# Patient Record
Sex: FEMALE | Race: WHITE | NOT HISPANIC OR LATINO | Employment: FULL TIME | URBAN - METROPOLITAN AREA
[De-identification: names, ages, dates, MRNs, and addresses within clinical notes are randomized per-mention and may not be internally consistent; named-entity substitution may affect disease eponyms.]

---

## 2017-01-27 ENCOUNTER — OFFICE VISIT (OUTPATIENT)
Dept: FAMILY MEDICINE | Facility: CLINIC | Age: 27
End: 2017-01-27
Payer: COMMERCIAL

## 2017-01-27 VITALS
SYSTOLIC BLOOD PRESSURE: 130 MMHG | WEIGHT: 127.25 LBS | BODY MASS INDEX: 21.72 KG/M2 | TEMPERATURE: 97.7 F | HEART RATE: 98 BPM | HEIGHT: 64 IN | OXYGEN SATURATION: 100 % | DIASTOLIC BLOOD PRESSURE: 79 MMHG

## 2017-01-27 DIAGNOSIS — J20.9 ACUTE BRONCHITIS, UNSPECIFIED ORGANISM: ICD-10-CM

## 2017-01-27 DIAGNOSIS — J01.90 ACUTE SINUSITIS WITH SYMPTOMS > 10 DAYS: ICD-10-CM

## 2017-01-27 DIAGNOSIS — R09.82 POST-NASAL DRIP: ICD-10-CM

## 2017-01-27 DIAGNOSIS — J06.9 UPPER RESPIRATORY TRACT INFECTION, UNSPECIFIED TYPE: Primary | ICD-10-CM

## 2017-01-27 PROCEDURE — 99214 OFFICE O/P EST MOD 30 MIN: CPT | Performed by: FAMILY MEDICINE

## 2017-01-27 RX ORDER — AMOXICILLIN AND CLAVULANATE POTASSIUM 500; 125 MG/1; MG/1
1 TABLET, FILM COATED ORAL 3 TIMES DAILY
Qty: 30 TABLET | Refills: 0 | Status: CANCELLED | OUTPATIENT
Start: 2017-01-27

## 2017-01-27 RX ORDER — AZITHROMYCIN 250 MG/1
TABLET, FILM COATED ORAL
Qty: 6 TABLET | Refills: 0 | Status: SHIPPED | OUTPATIENT
Start: 2017-01-27 | End: 2018-01-14

## 2017-01-27 RX ORDER — BENZONATATE 100 MG/1
100 CAPSULE ORAL 3 TIMES DAILY PRN
Qty: 42 CAPSULE | Refills: 0 | Status: SHIPPED | OUTPATIENT
Start: 2017-01-27 | End: 2018-01-14

## 2017-01-27 NOTE — PROGRESS NOTES
SUBJECTIVE:                                                    Marisol Banks is a 26 year old female who presents to clinic today for the following health issues:    RESPIRATORY SYMPTOMS      Duration: x a little over 1 week ; last couple of days patient thought she was on the upswing but yesterday progressively started to get worse    yesterday soreness in chest when cough and breathes, heavy , fever wed 2 days ago    Has had chills but also cold outside     Last night throat tight to swallow. 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     Therapies tried and outcome:  Cheryl [place] Cold and Flu ; tea & honey ; occ ibu for headache   Has a 20 mth old also sick at home  Has to work next 5 night shifts.    Problem list and histories reviewed & adjusted, as indicated.  Additional history: as documented    Patient Active Problem List   Diagnosis     Contraception     Past Surgical History   Procedure Laterality Date     Laparoscopic oophorectomy  2008     dermoid cyst, --actually had to do a low transverse incision     [place] teeth extraction         Social History   Substance Use Topics     Smoking status: Never Smoker      Smokeless tobacco: Never Used     Alcohol Use: No     Family History   Problem Relation Age of Onset     Myocardial Infarction Maternal Grandmother 50     in her 50s     HEART DISEASE Maternal Grandmother      Hypertension Maternal  "Grandfather      CEREBROVASCULAR DISEASE Maternal Grandfather      Breast Cancer Paternal Grandmother      CANCER Paternal Grandmother      bone marrow     Thyroid Disease Mother      HEART DISEASE Father      CANCER Maternal Grandfather      oral cancer, smoker         Current Outpatient Prescriptions   Medication Sig Dispense Refill     Multiple Vitamin (MULTIVITAMINS PO)        Allergies   Allergen Reactions     Pyrithione      Recent Labs   Lab Test  05/29/15   1957  01/03/14   0908   LDL   --   140*   HDL   --   61   TRIG   --   105   ALT  22   --    CR  1.06*   --    GFRESTIMATED  62   --    GFRESTBLACK  75   --    POTASSIUM  4.5   --       BP Readings from Last 3 Encounters:   01/27/17 130/79   02/27/16 134/84   05/31/15 136/84    Wt Readings from Last 3 Encounters:   01/27/17 127 lb 4 oz (57.72 kg)   02/27/16 118 lb (53.524 kg)   05/23/15 151 lb (68.493 kg)                  Labs reviewed in EPIC  Problem list, Medication list, Allergies, and Medical/Social/Surgical histories reviewed in [facility] and updated as appropriate.    ROS:  Constitutional, HEENT, cardiovascular, pulmonary, GI, , musculoskeletal, neuro, skin, endocrine and psych systems are negative, except as otherwise noted.    OBJECTIVE:                                                    /79 mmHg  Pulse 98  Temp(Src) 97.7  F (36.5  C) (Oral)  Ht 5' 4\" (1.626 m)  Wt 127 lb 4 oz (57.72 kg)  BMI 21.83 kg/m2  SpO2 100%  Body mass index is 21.83 kg/(m^2).  GENERAL: healthy, alert and no distress, has a cough , runny nose   EYES: Eyes grossly normal to inspection, PERRL and conjunctivae and sclerae normal  HENT: ear canals and TM's normal, nose and mouth without ulcers or lesions, clear post nasal drip, sinus mildly tender   NECK: no adenopathy, no asymmetry, masses, or scars and thyroid normal to palpation  RESP: lungs clear to auscultation - no rales, rhonchi or wheezes, has a cough   CV: regular rate and rhythm, normal S1 S2, no S3 or S4, no " murmur, click or rub, no peripheral edema and peripheral pulses strong  ABDOMEN: soft, non tender, no hepatosplenomegaly, no masses and bowel sounds normal  MS: no gross musculoskeletal defects noted, no edema  SKIN: no suspicious lesions or rashes  NEURO: Normal strength and tone, mentation intact and speech normal  PSYCH: mentation appears normal, affect normal/bright    Diagnostic Test Results:  No results found for this or any previous visit (from the past 24 hour(s)).     ASSESSMENT/PLAN:                                                      1. 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 Here today for a cold.  Your symptoms and exam today indicate that you have a viral upper respiratory illness.  This includes viral rhinosinusitis and viral bronchitis.  Antibiotics do not help viral illnesses; the best remedies treat the symptoms (see below).  The typical course of a viral illness is that you feel rather miserable for the first few days - with sore throat, runny nose/nasal congestion, cough, and sometimes fever and body aches.  You should start to feel better after about 5-7 days and much better by 10-14 days.  If you develop sudden worsening of symptoms or fever after the first 5-7 days, or if you have persistence of your symptoms beyond 14 days, let us know as you may have developed a secondary bacterial infection.      For symptom relief I suggest tryin. Steam.  Take a long, hot shower.  Or if you don't want to get in the shower just run it with the bathroom door shut for a few minutes and breathe the steam.  2. Drink hot liquids frequently such as tea or hot water with honey and lemon.  3. Acetaminophen (Tylenol) and ibuprofen  (Motrin or Advil) as needed for headache, sore throat, body aches, or fever.  4. For loosening phlegm and sputum try guaifenesin (available in many combination products and alone as plain Robitussin or plain Mucinex) and for cough suppression you can try dextromethorphan (Delsym or combined in other products).  5. For nasal congestion try:    An oral decongestant.  The only decongestant I recommend is pseudoephedrine. Ask the pharmacist for the over the counter (but real) pseudoephedrine - not phenylephrine.  This can raise your blood pressure and heart rate so do not use this if you have hypertension.       Afrin spray for 3 days.  (Never use afrin nasal spray for more than 3 days as there is a risk of developing tolerance and rebound/worsening nasal congestion if used longer than this.)    Nealmed sinus rinses.    Nasal steroid spray such as nasacort or flonase, which are over-the-counter.  6. And most importantly: plenty of rest and sleep  especially while you have a fever.     Stop smoking and avoid secondhand smoke    Drink lots of fluids such as water and clear soups. Fluids help loosen mucus. Fluids are also important because they help prevent dehydration.    Gargle with warm salt water a few times a day to relieve a sore throat. Throat sprays or lozenges may also help relieve the pain.    Avoid alcohol.    Use saline (salt water) nose drops to help loosen mucus and moisten the tender skin in your nose.  Encouraged mucinex, warm salt water gargles, cepacol spray, soothers/lozenges, sinus rinses (neilmed), flonase (2 sprays per nostril daily x 2 weeks), vitamin C, fluids and rest.  May alternate tylenol and NSAIDS (ibuprofen, Advil, aleve type products) every 4-6 hours for the next few days as needed.   No need for oral antibiotic at this time.    2. Acute sinusitis with symptoms > 10 days  if not better chest xray and cbc  Tessalon Perles three times a day for cough as needed  zpak  abx if not better on  above  Go to the Er if worse  Letter for work , Excused two days  Follow up Primary dr House for routine care  Recommend flu shot yearly and tdap if not up to date    3. Post-nasal drip  flonase & zyrtec    4. Acute bronchitis, unspecified organism  - benzonatate (TESSALON) 100 MG capsule; Take 1 capsule (100 mg) by mouth 3 times daily as needed for cough  Dispense: 42 capsule; Refill: 0  - azithromycin (ZITHROMAX) 250 MG tablet; Two tablets first day, then one tablet daily for four days.  Dispense: 6 tablet; Refill: 0 if not better     See Patient Instructions  Patient Instructions     flonase 1 spray each nostril daily 2 weeks  Zyrtec 10 mg daily 2 weeks  mucinex 600 mg twice a day 2 weeks  Supportive care as below  If not better chest xray and cbc  Tessalon perles three times a day for cough as needed  zpak  abx if not better on above  Go to the Er if worse  Letter for work   Excused two days  Follow up Primary dr House for routine care  Recommend flu shot yearly and tdap if not up to date    Your symptoms and exam today indicate that you have a viral upper respiratory illness.  This includes viral rhinosinusitis and viral bronchitis.  Antibiotics do not help viral illnesses; the best remedies treat the symptoms (see below).  The typical course of a viral illness is that you feel rather miserable for the first few days - with sore throat, runny nose/nasal congestion, cough, and sometimes fever and body aches.  You should start to feel better after about 5-7 days and much better by 10-14 days.  If you develop sudden worsening of symptoms or fever after the first 5-7 days, or if you have persistence of your symptoms beyond 14 days, let us know as you may have developed a secondary bacterial infection.      For symptom relief I suggest tryin. Steam.  Take a long, hot shower.  Or if you don't want to get in the shower just run it with the bathroom door shut for a few minutes and breathe the steam.  2. Drink hot  liquids frequently such as tea or hot water with honey and lemon.  3. Acetaminophen (Tylenol) and ibuprofen (Motrin or Advil) as needed for headache, sore throat, body aches, or fever.  4. For loosening phlegm and sputum try guaifenesin (available in many combination products and alone as plain Robitussin or plain Mucinex) and for cough suppression you can try dextromethorphan (Delsym or combined in other products).  5. For nasal congestion try:    An oral decongestant.  The only decongestant I recommend is pseudoephedrine. Ask the pharmacist for the over the counter (but real) pseudoephedrine - not phenylephrine.  This can raise your blood pressure and heart rate so do not use this if you have hypertension.       Afrin spray for 3 days.  (Never use afrin nasal spray for more than 3 days as there is a risk of developing tolerance and rebound/worsening nasal congestion if used longer than this.)    Nealmed sinus rinses.    Nasal steroid spray such as nasacort or flonase, which are over-the-counter.  6. And most importantly: plenty of rest and sleep  especially while you have a fever.     Stop smoking and avoid secondhand smoke    Drink lots of fluids such as water and clear soups. Fluids help loosen mucus. Fluids are also important because they help prevent dehydration.    Gargle with warm salt water a few times a day to relieve a sore throat. Throat sprays or lozenges may also help relieve the pain.    Avoid alcohol.    Use saline (salt water) nose drops to help loosen mucus and moisten the tender skin in your nose.  Encouraged mucinex, warm salt water gargles, cepacol spray, soothers/lozenges, sinus rinses (neilmed), flonase (2 sprays per nostril daily x 2 weeks), vitamin c, fluids and rest.  May alternate tylenol and NSAIDS (ibuprofen, advil, aleve type products) every 4-6 hours for the next few days as needed.   No need for oral antibiotic at this time.            Debra Odom MD  [facility]

## 2017-01-27 NOTE — MR AVS SNAPSHOT
After Visit Summary   2017    Marisol Banks    MRN: [id number]           Patient Information     Date Of Birth          1990        Visit Information        Provider Department      2017 9:20 AM Debra Odom MD [facility] [place]        Today's Diagnoses     Upper respiratory tract infection, unspecified type    -  1     Acute sinusitis with symptoms > 10 days         Post-nasal drip         Acute bronchitis, unspecified organism           Care Instructions      flonase 1 spray each nostril daily 2 weeks  Zyrtec 10 mg daily 2 weeks  mucinex 600 mg twice a day 2 weeks  Supportive care as below  If not better chest xray and cbc  Tessalon perles three times a day for cough as needed  zpak  abx if not better on above  Go to the Er if worse  Letter for work   Excused two days  Follow up Primary dr [facility] for routine care  Recommend flu shot yearly and tdap if not up to date    Your symptoms and exam today indicate that you have a viral upper respiratory illness.  This includes viral rhinosinusitis and viral bronchitis.  Antibiotics do not help viral illnesses; the best remedies treat the symptoms (see below).  The typical course of a viral illness is that you feel rather miserable for the first few days - with sore throat, runny nose/nasal congestion, cough, and sometimes fever and body aches.  You should start to feel better after about 5-7 days and much better by 10-14 days.  If you develop sudden worsening of symptoms or fever after the first 5-7 days, or if you have persistence of your symptoms beyond 14 days, let us know as you may have developed a secondary bacterial infection.      For symptom relief I suggest tryin. Steam.  Take a long, hot shower.  Or if you don't want to get in the shower just run it with the bathroom door shut for a few minutes and breathe the steam.  2. Drink hot liquids frequently such as tea or hot water with honey and lemon.  3. Acetaminophen  (Tylenol) and ibuprofen (Motrin or Advil) as needed for headache, sore throat, body aches, or fever.  4. For loosening phlegm and sputum try guaifenesin (available in many combination products and alone as plain Robitussin or plain Mucinex) and for cough suppression you can try dextromethorphan (Delsym or combined in other products).  5. For nasal congestion try:    An oral decongestant.  The only decongestant I recommend is pseudoephedrine. Ask the pharmacist for the over the counter (but real) pseudoephedrine - not phenylephrine.  This can raise your blood pressure and heart rate so do not use this if you have hypertension.       Afrin spray for 3 days.  (Never use afrin nasal spray for more than 3 days as there is a risk of developing tolerance and rebound/worsening nasal congestion if used longer than this.)    Nealmed sinus rinses.    Nasal steroid spray such as nasacort or flonase, which are over-the-counter.  6. And most importantly: plenty of rest and sleep  especially while you have a fever.     Stop smoking and avoid secondhand smoke    Drink lots of fluids such as water and clear soups. Fluids help loosen mucus. Fluids are also important because they help prevent dehydration.    Gargle with warm salt water a few times a day to relieve a sore throat. Throat sprays or lozenges may also help relieve the pain.    Avoid alcohol.    Use saline (salt water) nose drops to help loosen mucus and moisten the tender skin in your nose.  Encouraged mucinex, warm salt water gargles, cepacol spray, soothers/lozenges, sinus rinses (neilmed), flonase (2 sprays per nostril daily x 2 weeks), vitamin c, fluids and rest.  May alternate tylenol and NSAIDS (ibuprofen, advil, aleve type products) every 4-6 hours for the next few days as needed.   No need for oral antibiotic at this time.            Follow-ups after your visit        Who to contact     If you have questions or need follow up information about today's clinic visit  "or your schedule please contact [facility] directly at [phone].  Normal or non-critical lab and imaging results will be communicated to you by MyChart, letter or phone within 4 business days after the clinic has received the results. If you do not hear from us within 7 days, please contact the clinic through [employer] or phone. If you have a critical or abnormal lab result, we will notify you by phone as soon as possible.  Submit refill requests through [email or web address] or call your pharmacy and they will forward the refill request to us. Please allow 3 business days for your refill to be completed.          Additional Information About Your Visit        [email or web address] Information     [email or web address] gives you secure access to your electronic health record. If you see a primary care provider, you can also send messages to your care team and make appointments. If you have questions, please call your primary care clinic.  If you do not have a primary care provider, please call [phone] and they will assist you.        Care EveryWhere ID     This is your Care EveryWhere ID. This could be used by other organizations to access your [place] medical records  [id number]        Your Vitals Were     Pulse Temperature Height BMI (Body Mass Index) Pulse Oximetry       98 97.7  F (36.5  C) (Oral) 5' 4\" (1.626 m) 21.83 kg/m2 100%        Blood Pressure from Last 3 Encounters:   01/27/17 130/79   02/27/16 134/84   05/31/15 136/84    Weight from Last 3 Encounters:   01/27/17 127 lb 4 oz (57.72 kg)   02/27/16 118 lb (53.524 kg)   05/23/15 151 lb (68.493 kg)              Today, you had the following     No orders found for display         Today's Medication Changes          These changes are accurate as of: 1/27/17  9:43 AM.  If you have any questions, ask your nurse or doctor.               Start taking these medicines.        Dose/Directions    azithromycin 250 MG tablet   Commonly known as:  ZITHROMAX   Used for:  Acute " bronchitis, unspecified organism   Started by:  Debra Odom MD        Two tablets first day, then one tablet daily for four days.   Quantity:  6 tablet   Refills:  0       benzonatate 100 MG capsule   Commonly known as:  TESSALON   Used for:  Acute bronchitis, unspecified organism   Started by:  Debra Odom MD        Dose:  100 mg   Take 1 capsule (100 mg) by mouth 3 times daily as needed for cough   Quantity:  42 capsule   Refills:  0         Stop taking these medicines if you haven't already. Please contact your care team if you have questions.     COLACE PO   Stopped by:  Debra Odom MD           desogestrel-ethinyl estradiol 0.15-0.02/0.01 MG (21/5) per tablet   Commonly known as:  AZURETTE   Stopped by:  Debra Odom MD           ofloxacin 0.3 % ophthalmic solution   Commonly known as:  OCUFLOX   Stopped by:  Debra Odom MD           prenatal multivitamin  plus iron 27-0.8 MG Tabs per tablet   Stopped by:  Debra Odom MD           VITAMIN D (CHOLECALCIFEROL) PO   Stopped by:  Debra Odom MD                Where to get your medicines      These medications were sent to [facility] - [place], [place] - [address]  [address] [facility] [address]     Phone:  [phone]    - benzonatate 100 MG capsule      Some of these will need a paper prescription and others can be bought over the counter.  Ask your nurse if you have questions.     Bring a paper prescription for each of these medications    - azithromycin 250 MG tablet             Primary Care Provider Office Phone # Fax #    Katelyn Santacruz [phone]       [facility] [address]  [facility] [address]        Thank you!     Thank you for choosing [facility]  for your care. Our goal is always to provide you with excellent care. Hearing back from our patients is one way we can continue to improve our services. Please take a few minutes to complete the written survey that you may  receive in the mail after your visit with us. Thank you!             Your Updated Medication List - Protect others around you: Learn how to safely use, store and throw away your medicines at www.disposemymeds.org.          This list is accurate as of: 1/27/17  9:43 AM.  Always use your most recent med list.                   Brand Name Dispense Instructions for use    azithromycin 250 MG tablet    ZITHROMAX    6 tablet    Two tablets first day, then one tablet daily for four days.       benzonatate 100 MG capsule    TESSALON    42 capsule    Take 1 capsule (100 mg) by mouth 3 times daily as needed for cough       MULTIVITAMINS PO

## 2017-01-27 NOTE — PATIENT INSTRUCTIONS
flonase 1 spray each nostril daily 2 weeks  Zyrtec 10 mg daily 2 weeks  mucinex 600 mg twice a day 2 weeks  Supportive care as below  If not better chest xray and cbc  Tessalon perles three times a day for cough as needed  zpak  abx if not better on above  Go to the Er if worse  Letter for work   Excused two days  Follow up Primary dr [facility] for routine care  Recommend flu shot yearly and tdap if not up to date    Your symptoms and exam today indicate that you have a viral upper respiratory illness.  This includes viral rhinosinusitis and viral bronchitis.  Antibiotics do not help viral illnesses; the best remedies treat the symptoms (see below).  The typical course of a viral illness is that you feel rather miserable for the first few days - with sore throat, runny nose/nasal congestion, cough, and sometimes fever and body aches.  You should start to feel better after about 5-7 days and much better by 10-14 days.  If you develop sudden worsening of symptoms or fever after the first 5-7 days, or if you have persistence of your symptoms beyond 14 days, let us know as you may have developed a secondary bacterial infection.      For symptom relief I suggest tryin. Steam.  Take a long, hot shower.  Or if you don't want to get in the shower just run it with the bathroom door shut for a few minutes and breathe the steam.  2. Drink hot liquids frequently such as tea or hot water with honey and lemon.  3. Acetaminophen (Tylenol) and ibuprofen (Motrin or Advil) as needed for headache, sore throat, body aches, or fever.  4. For loosening phlegm and sputum try guaifenesin (available in many combination products and alone as plain Robitussin or plain Mucinex) and for cough suppression you can try dextromethorphan (Delsym or combined in other products).  5. For nasal congestion try:    An oral decongestant.  The only decongestant I recommend is pseudoephedrine. Ask the pharmacist for the over the counter (but real)  pseudoephedrine - not phenylephrine.  This can raise your blood pressure and heart rate so do not use this if you have hypertension.       Afrin spray for 3 days.  (Never use afrin nasal spray for more than 3 days as there is a risk of developing tolerance and rebound/worsening nasal congestion if used longer than this.)    Nealmed sinus rinses.    Nasal steroid spray such as nasacort or flonase, which are over-the-counter.  6. And most importantly: plenty of rest and sleep  especially while you have a fever.     Stop smoking and avoid secondhand smoke    Drink lots of fluids such as water and clear soups. Fluids help loosen mucus. Fluids are also important because they help prevent dehydration.    Gargle with warm salt water a few times a day to relieve a sore throat. Throat sprays or lozenges may also help relieve the pain.    Avoid alcohol.    Use saline (salt water) nose drops to help loosen mucus and moisten the tender skin in your nose.  Encouraged mucinex, warm salt water gargles, cepacol spray, soothers/lozenges, sinus rinses (neilmed), flonase (2 sprays per nostril daily x 2 weeks), vitamin c, fluids and rest.  May alternate tylenol and NSAIDS (ibuprofen, advil, aleve type products) every 4-6 hours for the next few days as needed.   No need for oral antibiotic at this time.

## 2017-01-27 NOTE — Clinical Note
[facility]  [address] [facility] [address]  Phone: [phone]    January 27, 2017        Marisol Banks  [address] [facility] [address]          To whom it may concern:    RE: Marisol Banks    Patient was seen and treated today at our clinic and missed work. May return to work 1/29/17 if well     Please contact me for questions or concerns.      Sincerely,        Debra Odom MD

## 2017-11-26 ENCOUNTER — HEALTH MAINTENANCE LETTER (OUTPATIENT)
Age: 27
End: 2017-11-26

## 2018-01-14 ENCOUNTER — OFFICE VISIT (OUTPATIENT)
Dept: URGENT CARE | Facility: URGENT CARE | Age: 28
End: 2018-01-14
Payer: COMMERCIAL

## 2018-01-14 VITALS
HEIGHT: 64 IN | TEMPERATURE: 98.3 F | OXYGEN SATURATION: 100 % | HEART RATE: 88 BPM | DIASTOLIC BLOOD PRESSURE: 82 MMHG | SYSTOLIC BLOOD PRESSURE: 144 MMHG | WEIGHT: 128 LBS | BODY MASS INDEX: 21.85 KG/M2

## 2018-01-14 DIAGNOSIS — J02.9 VIRAL PHARYNGITIS: Primary | ICD-10-CM

## 2018-01-14 LAB
DEPRECATED S PYO AG THROAT QL EIA: NORMAL
SPECIMEN SOURCE: NORMAL

## 2018-01-14 PROCEDURE — 99213 OFFICE O/P EST LOW 20 MIN: CPT | Performed by: FAMILY MEDICINE

## 2018-01-14 PROCEDURE — 87880 STREP A ASSAY W/OPTIC: CPT | Performed by: FAMILY MEDICINE

## 2018-01-14 PROCEDURE — 87081 CULTURE SCREEN ONLY: CPT | Performed by: FAMILY MEDICINE

## 2018-01-14 ASSESSMENT — ENCOUNTER SYMPTOMS
FREQUENCY: 0
NAUSEA: 0
COUGH: 0
DIARRHEA: 0
ABDOMINAL PAIN: 0
DYSURIA: 0
BLURRED VISION: 0
FEVER: 0
VOMITING: 0
CHILLS: 0
CONSTIPATION: 0
SHORTNESS OF BREATH: 0

## 2018-01-14 NOTE — PATIENT INSTRUCTIONS

## 2018-01-14 NOTE — PROGRESS NOTES
"SUBJECTIVE:  Marisol Banks is a 27 year old female with a chief complaint of sore throat.  Onset of symptoms was 6 day(s) ago.    Course of illness: worsening.  Severity moderate, mainly concerned about exudates on back of throat.   Current and Associated symptoms: cough - non-productive, body aches and fatigue doesn't feel she has the flu  Treatment measures tried include Tylenol/Ibuprofen and Rest.  Predisposing factors include ill contact: Work.    Past Medical History:   Diagnosis Date     Chest pain     evaluated years ago, thought anxiety, normal Holter, normal EKG     Hypertension     \"white coat\" work ups normal     Indication for care in labor or delivery 2015      (normal spontaneous vaginal delivery) 2015     Postpartum care and examination immediately after delivery 2015     No current outpatient prescriptions on file.     Social History   Substance Use Topics     Smoking status: Never Smoker     Smokeless tobacco: Never Used     Alcohol use No       ROS:  Review of Systems   Constitutional: Negative for chills and fever.   Eyes: Negative for blurred vision.   Respiratory: Negative for cough and shortness of breath.    Cardiovascular: Negative for chest pain.   Gastrointestinal: Negative for abdominal pain, constipation, diarrhea, nausea and vomiting.   Genitourinary: Negative for dysuria and frequency.   Skin: Negative for rash.         OBJECTIVE:   /82 (BP Location: Right arm, Patient Position: Chair, Cuff Size: Adult Regular)  Pulse 88  Temp 98.3  F (36.8  C) (Oral)  Ht 5' 4\" (1.626 m)  Wt 128 lb (58.1 kg)  SpO2 100%  BMI 21.97 kg/m2  GENERAL APPEARANCE: healthy, alert and no distress  EYES: EOMI,  PERRL, conjunctiva clear  HENT: ear canals and TM's normal.  Nose normal.  Pharynx erythematous with some exudate noted.  NECK: supple, non-tender to palpation, no adenopathy noted  RESP: lungs clear to auscultation - no rales, rhonchi or wheezes  CV: regular rates and " rhythm, normal S1 S2, no murmur noted  ABDOMEN:  soft, nontender, no HSM or masses and bowel sounds normal  SKIN: no suspicious lesions or rashes    Rapid Strep test is negative; await throat culture results.    ASSESSMENT:   Pharyngitis  Marisol was seen today for pharyngitis and urgent care.    Diagnoses and all orders for this visit:    Pharyngitis  -     Strep, Rapid Screen  -     Beta strep group A culture      PLAN:   See orders in epic.   Symptomatic treat with gargles, lozenges, and OTC analgesic as needed. Follow-up with primary clinic if not improving.    Aki Kaur

## 2018-01-14 NOTE — NURSING NOTE
"Marisol Banks;   Chief Complaint   Patient presents with     Pharyngitis     onset 1 week ago, white spots on right tonsill, cold sx.      Urgent Care     Initial /82 (BP Location: Right arm, Patient Position: Chair, Cuff Size: Adult Regular)  Pulse 88  Temp 98.3  F (36.8  C) (Oral)  Ht 5' 4\" (1.626 m)  Wt 128 lb (58.1 kg)  SpO2 100%  BMI 21.97 kg/m2 Estimated body mass index is 21.97 kg/(m^2) as calculated from the following:    Height as of this encounter: 5' 4\" (1.626 m).    Weight as of this encounter: 128 lb (58.1 kg)..  BP completed using cuff size regular.  Janie Mendez R.N.  "

## 2018-01-14 NOTE — MR AVS SNAPSHOT
After Visit Summary   1/14/2018    Marisol Banks    MRN: [id number]           Patient Information     Date Of Birth          1990        Visit Information        Provider Department      1/14/2018 2:15 PM Aki Kaur MD [facility]        Today's Diagnoses     Pharyngitis    -  1      Care Instructions       * PHARYNGITIS (Sore Throat),REPORT PENDING    Pharyngitis (sore throat) is often due to a virus, but can also be caused by the  strep  bacteria. This is called  strep throat . Both viral and strep infection can cause throat pain that is worse when swallowing, aching all over with headache and fever. Both types of infections are contagious. They may be spread by coughing, kissing or touching others after touching your mouth or nose, so wash your hands often.  A test has been done to determine whether or not you have strep throat. If it is positive for strep infection you will usually need to take antibiotics. If the test is negative, you probably have a viral pharyngitis, and antibiotic treatment will not help you recover.  HOME CARE:    If your symptoms are severe, rest at home for the first 2-3 days. If you are told that your test is positive for strep, you should be off work and school for the first two days of antibiotic treatment. After that, you will no longer be as contagious.    Children: Use acetaminophen (Tylenol) for fever, fussiness or discomfort. In infants over six months of age, you may use ibuprofen (Children's Motrin) instead of Tylenol. [NOTE: If your child has chronic liver or kidney disease or ever had a stomach ulcer or GI bleeding, talk with your doctor before using these medicines.]   (Aspirin should never be used in anyone under 18 years of age who is ill with a fever. It may cause severe liver damage.)  Adults: You may use acetaminophen (Tylenol) 650-1000 mg every 6 hours or ibuprofen (Motrin, Advil) 600 mg every 6-8 hours with food to  control pain, if you are able to take these medicines. [NOTE: If you have chronic liver or kidney disease or ever had a stomach ulcer or GI bleeding, talk with your doctor before using these medicines.]    Throat lozenges or sprays (Chloraseptic and others), or gargling with warm salt water will reduce throat pain. Dissolve 1/2 teaspoon of salt in 1 glass of warm water. This is especially useful just before meals.     FOLLOW UP with your doctor as advised by our staff if you are not improving over the next week.  GET PROMPT MEDICAL ATTENTION  if any of the following occur:    Fever over 101 F (38.3 C) for more than three days    New or worsening ear pain, sinus pain or headache    Unable to swallow liquids or open your mouth wide due to throat pain    Trouble breathing    Muffled voice    New rash       4001-1895 [employer]. [address] [place] [address]. All rights reserved. This information is not intended as a substitute for professional medical care. Always follow your healthcare professional's instructions.  This information has been modified by your health care provider with permission from the publisher.            Follow-ups after your visit        Follow-up notes from your care team     Return if symptoms worsen or fail to improve.      Who to contact     If you have questions or need follow up information about today's clinic visit or your schedule please contact [facility] directly at [phone].  Normal or non-critical lab and imaging results will be communicated to you by MyChart, letter or phone within 4 business days after the clinic has received the results. If you do not hear from us within 7 days, please contact the clinic through MyChart or phone. If you have a critical or abnormal lab result, we will notify you by phone as soon as possible.  Submit refill requests through [employer] or call your pharmacy and they will forward the refill request to  "us. Please allow 3 business days for your refill to be completed.          Additional Information About Your Visit        MyChart Information     [employer] gives you secure access to your electronic health record. If you see a primary care provider, you can also send messages to your care team and make appointments. If you have questions, please call your primary care clinic.  If you do not have a primary care provider, please call [phone] and they will assist you.        Care EveryWhere ID     This is your Care EveryWhere ID. This could be used by other organizations to access your [facility] medical records  [id number]        Your Vitals Were     Pulse Temperature Height Pulse Oximetry BMI (Body Mass Index)       88 98.3  F (36.8  C) (Oral) 5' 4\" (1.626 m) 100% 21.97 kg/m2        Blood Pressure from Last 3 Encounters:   01/14/18 144/82   01/27/17 130/79   02/27/16 134/84    Weight from Last 3 Encounters:   01/14/18 128 lb (58.1 kg)   01/27/17 127 lb 4 oz (57.7 kg)   02/27/16 118 lb (53.5 kg)              We Performed the Following     Beta strep group A culture     Strep, Rapid Screen          Today's Medication Changes          These changes are accurate as of: 1/14/18  3:37 PM.  If you have any questions, ask your nurse or doctor.               Stop taking these medicines if you haven't already. Please contact your care team if you have questions.     azithromycin 250 MG tablet   Commonly known as:  ZITHROMAX   Stopped by:  Aki Kaur MD           benzonatate 100 MG capsule   Commonly known as:  TESSALON   Stopped by:  Aki Kaur MD           MULTIVITAMINS PO   Stopped by:  Aki Kaur MD                    Primary Care Provider Office Phone # Fax #    Katelyn Santacruz [phone] [address] [place] [address]        Equal Access to Services     CHILANGO JOSHI AH: Christian Jacobo, shelley rodriguez, qaybpamela paganalmarivel hylton " dashawn schwartzjoseilsa mirandaAngelikaaakevin ah. So [facility] [phone].    ATENCIÓN: Si habla español, tiene a aparicio disposición servicios gratuitos de asistencia lingüística. Saleem al [phone].    We comply with applicable federal civil rights laws and [place] laws. We do not discriminate on the basis of race, color, national origin, age, disability, sex, sexual orientation, or gender identity.            Thank you!     Thank you for choosing [facility]  for your care. Our goal is always to provide you with excellent care. Hearing back from our patients is one way we can continue to improve our services. Please take a few minutes to complete the written survey that you may receive in the mail after your visit with us. Thank you!             Your Updated Medication List - Protect others around you: Learn how to safely use, store and throw away your medicines at www.disposemymeds.org.      Notice  As of 1/14/2018  3:37 PM    You have not been prescribed any medications.

## 2018-01-15 LAB
BACTERIA SPEC CULT: NORMAL
SPECIMEN SOURCE: NORMAL

## 2018-04-16 ENCOUNTER — VIRTUAL VISIT (OUTPATIENT)
Dept: FAMILY MEDICINE | Facility: OTHER | Age: 28
End: 2018-04-16

## 2018-04-17 NOTE — PROGRESS NOTES
"Date:   Clinician: Jerardo Antonio  Clinician NPI: [id number]  Patient: Marisol Banks  Patient : 1990  Patient Address: [address] [place] [address]  Patient Phone: [phone]  Visit Protocol: Yeast infection  Patient Summary:  Marisol is a 28 year old ( : 1990 ) female who initiated a Visit for a presumed vaginal yeast infection. When asked the question \"Please sign me up to receive news, health information and promotions from [facility].\", Marisol responded \"No\".    5-10 days ago, she began noticing perivulvar pruritus.   She denies having open sores, perivulvar rash, vaginal pruritus, vaginal discharge, and abdominal pain. She also denies feeling feverish.   She has had two (2) occurrences in the past year and the current symptoms are similar to previous yeast infections. She has not tried to treat her current symptoms with any medication.   She denies taking antibiotics in the past 2 weeks. She prefers a fluconazole (Diflucan) Pill.   She denies pregnancy and denies breastfeeding. She has menstruated in the past month.   She denies risk factors for sexually transmitted infections. She does NOT smoke or use smokeless tobacco.   MEDICATIONS:    Ibuprofen oral  , ALLERGIES:   NKDA    Clinician Response:  Dear Marisol,  Based on the information you have provided, you likely have a vaginal yeast infection which is a common infection of the vagina caused by a fungus.  I am prescribing:   Terconazole 80 mg vaginal suppository. Insert 1 suppository into vagina 1 time per day at bedtime for 3 days. There are no refills with this prescription.  While you have yeast infection symptoms, do the following:      Avoid irritants such as scented bath products, tampons, pads, or vaginal sprays and powders.    Avoid douching.    Wear cotton underwear and if you are comfortable doing so, do not wear underwear to bed.    Avoid hot tubs and whirlpool spas.     Most women notice improvement in " their symptoms within 1-2 days after starting treatment with complete clearing in 5-7 days. If your symptoms have not improved in 3 days or not resolved in 10 days, please schedule an appointment to see your primary care provider for an evaluation as your symptoms may be caused by an infection other than yeast. Sometimes yeast infections can be associated with other vaginal infections or with sexually transmitted infections (STIs). If you think you may be at risk for a STI please be seen in a clinic.   Diagnosis: Candida Vulvovaginitis  Diagnosis ICD: B37.3  Additional Clinician Notes: Diflucan pill is not listed as an option of treatment for your online encounter.     Prescription: terconazole 80 mg vaginal suppository 1 3 suppository box, 3 days supply. Insert 1 suppository into vagina 1 time per day at bedtime for 3 days. Refills: 0, Refill as needed: no, Allow substitutions: yes  Pharmacy: [id number] - [phone] [address] [place] [address]

## 2018-06-04 LAB
ABO + RH BLD: NORMAL
ABO + RH BLD: NORMAL
BLD GP AB SCN SERPL QL: NORMAL
HBV SURFACE AG SERPL QL IA: NEGATIVE
HIV 1+2 AB+HIV1 P24 AG SERPL QL IA: NEGATIVE
RUBELLA ANTIBODY IGG QUANTITATIVE: NORMAL IU/ML
TREPONEMA ANTIBODIES: NONREACTIVE

## 2018-11-01 ENCOUNTER — HOSPITAL ENCOUNTER (OUTPATIENT)
Facility: CLINIC | Age: 28
Discharge: HOME | End: 2018-11-01
Attending: OBSTETRICS & GYNECOLOGY | Admitting: OBSTETRICS & GYNECOLOGY
Payer: COMMERCIAL

## 2018-11-01 VITALS
HEIGHT: 64 IN | DIASTOLIC BLOOD PRESSURE: 73 MMHG | BODY MASS INDEX: 26.29 KG/M2 | RESPIRATION RATE: 16 BRPM | TEMPERATURE: 98.7 F | WEIGHT: 154 LBS | SYSTOLIC BLOOD PRESSURE: 129 MMHG | HEART RATE: 105 BPM
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
## 2018-11-01 PROCEDURE — G0463 HOSPITAL OUTPT CLINIC VISIT: HCPCS

## 2018-11-01 RX ORDER — ASPIRIN 300 MG/1
81 SUPPOSITORY RECTAL DAILY
Status: ON HOLD | COMMUNITY
End: 2019-01-18

## 2018-11-01 RX ORDER — PROGESTERONE 50 MG/ML
250 INJECTION, SOLUTION INTRAMUSCULAR WEEKLY
Status: ON HOLD | COMMUNITY
End: 2019-01-16

## 2018-11-01 RX ORDER — PRENATAL VIT/IRON FUM/FOLIC AC 27MG-0.8MG
1 TABLET ORAL DAILY
COMMUNITY
End: 2020-12-14

## 2018-11-01 RX ORDER — ONDANSETRON 2 MG/ML
4 INJECTION INTRAMUSCULAR; INTRAVENOUS EVERY 6 HOURS PRN
Status: DISCONTINUED | OUTPATIENT
Start: 2018-11-01 | End: 2018-11-01 | Stop reason: HOSPADM

## 2018-11-01 RX ORDER — BISACODYL 5 MG/1
5 TABLET, DELAYED RELEASE ORAL DAILY PRN
Status: ON HOLD | COMMUNITY
End: 2019-01-16

## 2018-11-01 NOTE — PLAN OF CARE
Pt arrived to [facility] last night. PT states she did not hit her abd. PT states she tripped over a gas pump and fell on her right elbow. PT states she has pain in her right groin 1/10 when she moves. Denies any pain while resting,. Prenatal record available for review and health history obtained from patient. Pt placed on EFM and MD notified of patients arrival. Orders received for monitoring for 4 hours. Call MD before discharge. PT noted to be having few contractions and irritability. PT states only feels tightening and not out of her normal. MD is aware of this.

## 2018-11-01 NOTE — IP AVS SNAPSHOT
[facility]    [address] [place] [address], [address]    [facility] [address]    Phone:  [phone]                                       After Visit Summary   11/1/2018    Marisol Banks    MRN: [id number]           After Visit Summary Signature Page     I have received my discharge instructions, and my questions have been answered. I have discussed any challenges I see with this plan with the nurse or doctor.    ..........................................................................................................................................  Patient/Patient Representative Signature      ..........................................................................................................................................  Patient Representative Print Name and Relationship to Patient    ..................................................               ................................................  Date                                   Time    ..........................................................................................................................................  Reviewed by Signature/Title    ...................................................              ..............................................  Date                                               Time          22EPIC Rev 08/18

## 2018-11-01 NOTE — DISCHARGE INSTRUCTIONS
Discharge Instruction for Undelivered Patients      You were seen for: Labor Assessment for post fall  We Consulted: Dr Gregg Jules  You had (Test or Medicine):Four hours of fetal monitoring     Diet:   Drink 8 to 12 glasses of liquids (milk, juice, water) every day.     Activity:  Per usual routine    Call your provider if you notice:  Swelling in your face or increased swelling in your hands or legs.  Headaches that are not relieved by Tylenol (acetaminophen).  Changes in your vision (blurring: seeing spots or stars.)  Nausea (sick to your stomach) and vomiting (throwing up).   Weight gain of 5 pounds or more per week.  Heartburn that doesn't go away.  Signs of bladder infection: pain when you urinate (use the toilet), need to go more often and more urgently.  The bag of miller (rupture of membranes) breaks, or you notice leaking in your underwear.  Bright red blood in your underwear.  Abdominal (lower belly) or stomach pain.  For first baby: Contractions (tightening) less than 5 minutes apart for one hour or more.  Second (plus) baby: Contractions (tightening) less than 10 minutes apart and getting stronger.  *If less than 34 weeks: Contractions (tightenings) more than 6 times in one hour.  Increase or change in vaginal discharge (note the color and amount)  Other: Call for any other concerns/questions    Follow-up:  Please make appointment to been seen early next week.

## 2018-11-01 NOTE — PLAN OF CARE
SVE performed per MD order. External 1cm. RN did not force finger through cervix. Some blood noted on exam glove. Dr Gregg Jules updated on SVE. Orders to discontinue to home and follow up in clinic next week. PT verbalizes understanding of discharge and instructions and will call for increased contractions, bleeding or any other concerns.

## 2018-11-01 NOTE — IP AVS SNAPSHOT
MRN:[id number]                      After Visit Summary   11/1/2018    Marisol Banks    MRN: [id number]           Thank you!     Thank you for choosing [facility] for your care. Our goal is always to provide you with excellent care. Hearing back from our patients is one way we can continue to improve our services. Please take a few minutes to complete the written survey that you may receive in the mail after you visit with us. Thank you!        Patient Information     Date Of Birth          1990        About your hospital stay     You were admitted on:  November 1, 2018 You last received care in the:  [facility]    You were discharged on:  November 1, 2018       Who to Call     For medical emergencies, please call 911.  For non-urgent questions about your medical care, please call your primary care provider or clinic, [phone]          Attending Provider     Provider Specialty    Lexii Muller MD Obstetrics       Primary Care Provider Office Phone # Fax #    Katelyn Santacruz [phone]      Further instructions from your care team       Discharge Instruction for Undelivered Patients      You were seen for: Labor Assessment for post fall  We Consulted: Dr Gregg Jules  You had (Test or Medicine):Four hours of fetal monitoring     Diet:   Drink 8 to 12 glasses of liquids (milk, juice, water) every day.     Activity:  Per usual routine    Call your provider if you notice:  Swelling in your face or increased swelling in your hands or legs.  Headaches that are not relieved by Tylenol (acetaminophen).  Changes in your vision (blurring: seeing spots or stars.)  Nausea (sick to your stomach) and vomiting (throwing up).   Weight gain of 5 pounds or more per week.  Heartburn that doesn't go away.  Signs of bladder infection: pain when you urinate (use the toilet), need to go more often and more urgently.  The bag of miller (rupture of membranes) breaks,  "or you notice leaking in your underwear.  Bright red blood in your underwear.  Abdominal (lower belly) or stomach pain.  For first baby: Contractions (tightening) less than 5 minutes apart for one hour or more.  Second (plus) baby: Contractions (tightening) less than 10 minutes apart and getting stronger.  *If less than 34 weeks: Contractions (tightenings) more than 6 times in one hour.  Increase or change in vaginal discharge (note the color and amount)  Other: Call for any other concerns/questions    Follow-up:  Please make appointment to been seen early next week.           Pending Results     No orders found from 10/30/2018 to 11/2/2018.            Admission Information     Date & Time Provider Department Dept. Phone    11/1/2018 Lexii Muller MD [facility] [phone]      Your Vitals Were     Blood Pressure Pulse Temperature Respirations Height Weight    129/73 105 98.7  F (37.1  C) (Temporal) 16 1.626 m (5' 4\") 69.9 kg (154 lb)    BMI (Body Mass Index)                   26.43 kg/m2           MyChart Information     [employer] gives you secure access to your electronic health record. If you see a primary care provider, you can also send messages to your care team and make appointments. If you have questions, please call your primary care clinic.  If you do not have a primary care provider, please call [phone] and they will assist you.        Care EveryWhere ID     This is your Care EveryWhere ID. This could be used by other organizations to access your [facility] medical records  [id number]        Equal Access to Services     [facility] AH: Hadii aad ku hadasho Soashwiniali, waaxda luqadaha, qaybta kaalmada adeegyada, marivel dietrich. So [facility] [phone].    ATENCIÓN: Si habla español, tiene a aparicio disposición servicios gratuitos de asistencia lingüística. Llame al [phone].    We comply with applicable federal civil rights laws and [place] laws. We do " not discriminate on the basis of race, color, national origin, age, disability, sex, sexual orientation, or gender identity.               Review of your medicines      UNREVIEWED medicines. Ask your doctor about these medicines        Dose / Directions    aspirin 300 MG Suppository   Indication:  Increased Blood Pressure and Edema During Pregnancy        Dose:  81 mg   Take 81 mg by mouth daily   Refills:  0       bisacodyl 5 MG EC tablet   Commonly known as:  DULCOLAX        Dose:  5 mg   Take 5 mg by mouth daily as needed for constipation   Refills:  0       prenatal multivitamin plus iron 27-0.8 MG Tabs per tablet        Dose:  1 tablet   Take 1 tablet by mouth daily   Refills:  0       progesterone (in sesame oil) 50 MG/ML injection   Indication:  Hx of PTL        Dose:  250 mg   Inject 250 mg into the muscle once a week   Refills:  0                Protect others around you: Learn how to safely use, store and throw away your medicines at www.disposemymeds.org.             Medication List: This is a list of all your medications and when to take them. Check marks below indicate your daily home schedule. Keep this list as a reference.      Medications           Morning Afternoon Evening Bedtime As Needed    aspirin 300 MG Suppository   Take 81 mg by mouth daily                                bisacodyl 5 MG EC tablet   Commonly known as:  DULCOLAX   Take 5 mg by mouth daily as needed for constipation                                prenatal multivitamin plus iron 27-0.8 MG Tabs per tablet   Take 1 tablet by mouth daily                                progesterone (in sesame oil) 50 MG/ML injection   Inject 250 mg into the muscle once a week

## 2018-12-26 LAB — GROUP B STREP PCR: NORMAL

## 2019-01-16 ENCOUNTER — ANESTHESIA EVENT (OUTPATIENT)
Dept: OBGYN | Facility: CLINIC | Age: 29
End: 2019-01-16
Payer: COMMERCIAL

## 2019-01-16 ENCOUNTER — ANESTHESIA (OUTPATIENT)
Dept: OBGYN | Facility: CLINIC | Age: 29
End: 2019-01-16
Payer: COMMERCIAL

## 2019-01-16 ENCOUNTER — HOSPITAL ENCOUNTER (INPATIENT)
Facility: CLINIC | Age: 29
LOS: 3 days | Discharge: HOME | End: 2019-01-19
Attending: OBSTETRICS & GYNECOLOGY | Admitting: OBSTETRICS & GYNECOLOGY
Payer: COMMERCIAL

## 2019-01-16 DIAGNOSIS — Z98.891 S/P PRIMARY LOW TRANSVERSE C-SECTION: Primary | ICD-10-CM

## 2019-01-16 LAB
ABO + RH BLD: NORMAL
ABO + RH BLD: NORMAL
BLD GP AB SCN SERPL QL: NORMAL
BLOOD BANK CMNT PATIENT-IMP: NORMAL
ERYTHROCYTE [DISTWIDTH] IN BLOOD BY AUTOMATED COUNT: 13.9 % (ref 10–15)
HCT VFR BLD AUTO: 35.7 % (ref 35–47)
HGB BLD-MCNC: 11.8 G/DL (ref 11.7–15.7)
MCH RBC QN AUTO: 28.8 PG (ref 26.5–33)
MCHC RBC AUTO-ENTMCNC: 33.1 G/DL (ref 31.5–36.5)
MCV RBC AUTO: 87 FL (ref 78–100)
PLATELET # BLD AUTO: 248 10E9/L (ref 150–450)
PLATELET # BLD AUTO: 248 10E9/L (ref 150–450)
RBC # BLD AUTO: 4.1 10E12/L (ref 3.8–5.2)
SPECIMEN EXP DATE BLD: NORMAL
T PALLIDUM AB SER QL: NONREACTIVE
WBC # BLD AUTO: 9.4 10E9/L (ref 4–11)

## 2019-01-16 PROCEDURE — 00HU33Z INSERTION OF INFUSION DEVICE INTO SPINAL CANAL, PERCUTANEOUS APPROACH: ICD-10-PCS | Performed by: ANESTHESIOLOGY

## 2019-01-16 PROCEDURE — 25000128 H RX IP 250 OP 636: Performed by: ANESTHESIOLOGY

## 2019-01-16 PROCEDURE — 25000125 ZZHC RX 250: Performed by: ANESTHESIOLOGY

## 2019-01-16 PROCEDURE — 86900 BLOOD TYPING SEROLOGIC ABO: CPT | Performed by: OBSTETRICS & GYNECOLOGY

## 2019-01-16 PROCEDURE — 71000012 ZZH RECOVERY PHASE 1 LEVEL 1 FIRST HR: Performed by: OBSTETRICS & GYNECOLOGY

## 2019-01-16 PROCEDURE — 37000009 ZZH ANESTHESIA TECHNICAL FEE, EACH ADDTL 15 MIN: Performed by: OBSTETRICS & GYNECOLOGY

## 2019-01-16 PROCEDURE — 25000132 ZZH RX MED GY IP 250 OP 250 PS 637: Performed by: OBSTETRICS & GYNECOLOGY

## 2019-01-16 PROCEDURE — 85049 AUTOMATED PLATELET COUNT: CPT | Performed by: OBSTETRICS & GYNECOLOGY

## 2019-01-16 PROCEDURE — 25000125 ZZHC RX 250: Performed by: OBSTETRICS & GYNECOLOGY

## 2019-01-16 PROCEDURE — 25000128 H RX IP 250 OP 636: Performed by: OBSTETRICS & GYNECOLOGY

## 2019-01-16 PROCEDURE — 37000008 ZZH ANESTHESIA TECHNICAL FEE, 1ST 30 MIN: Performed by: OBSTETRICS & GYNECOLOGY

## 2019-01-16 PROCEDURE — 27210794 ZZH OR GENERAL SUPPLY STERILE: Performed by: OBSTETRICS & GYNECOLOGY

## 2019-01-16 PROCEDURE — 3E0R3BZ INTRODUCTION OF ANESTHETIC AGENT INTO SPINAL CANAL, PERCUTANEOUS APPROACH: ICD-10-PCS | Performed by: ANESTHESIOLOGY

## 2019-01-16 PROCEDURE — 12000035 ZZH R&B POSTPARTUM

## 2019-01-16 PROCEDURE — 36415 COLL VENOUS BLD VENIPUNCTURE: CPT | Performed by: OBSTETRICS & GYNECOLOGY

## 2019-01-16 PROCEDURE — 37000011 ZZH ANESTHESIA WARD SERVICE

## 2019-01-16 PROCEDURE — 86850 RBC ANTIBODY SCREEN: CPT | Performed by: OBSTETRICS & GYNECOLOGY

## 2019-01-16 PROCEDURE — 25000125 ZZHC RX 250: Performed by: REGISTERED NURSE

## 2019-01-16 PROCEDURE — 86901 BLOOD TYPING SEROLOGIC RH(D): CPT | Performed by: OBSTETRICS & GYNECOLOGY

## 2019-01-16 PROCEDURE — 25000132 ZZH RX MED GY IP 250 OP 250 PS 637

## 2019-01-16 PROCEDURE — 25000128 H RX IP 250 OP 636

## 2019-01-16 PROCEDURE — 25000128 H RX IP 250 OP 636: Performed by: REGISTERED NURSE

## 2019-01-16 PROCEDURE — 85027 COMPLETE CBC AUTOMATED: CPT | Performed by: OBSTETRICS & GYNECOLOGY

## 2019-01-16 PROCEDURE — 36000058 ZZH SURGERY LEVEL 3 EA 15 ADDTL MIN: Performed by: OBSTETRICS & GYNECOLOGY

## 2019-01-16 PROCEDURE — 86780 TREPONEMA PALLIDUM: CPT | Performed by: OBSTETRICS & GYNECOLOGY

## 2019-01-16 PROCEDURE — 36000056 ZZH SURGERY LEVEL 3 1ST 30 MIN: Performed by: OBSTETRICS & GYNECOLOGY

## 2019-01-16 RX ORDER — OXYTOCIN 10 [USP'U]/ML
10 INJECTION, SOLUTION INTRAMUSCULAR; INTRAVENOUS
Status: DISCONTINUED | OUTPATIENT
Start: 2019-01-16 | End: 2019-01-16

## 2019-01-16 RX ORDER — LANOLIN 100 %
OINTMENT (GRAM) TOPICAL
Status: DISCONTINUED | OUTPATIENT
Start: 2019-01-16 | End: 2019-01-19 | Stop reason: HOSPADM

## 2019-01-16 RX ORDER — LIDOCAINE HYDROCHLORIDE AND EPINEPHRINE 15; 5 MG/ML; UG/ML
INJECTION, SOLUTION EPIDURAL PRN
Status: DISCONTINUED | OUTPATIENT
Start: 2019-01-16 | End: 2019-01-16 | Stop reason: HOSPADM

## 2019-01-16 RX ORDER — KETOROLAC TROMETHAMINE 30 MG/ML
INJECTION, SOLUTION INTRAMUSCULAR; INTRAVENOUS
Status: COMPLETED
Start: 2019-01-16 | End: 2019-01-16

## 2019-01-16 RX ORDER — CEFAZOLIN SODIUM 1 G/3ML
1 INJECTION, POWDER, FOR SOLUTION INTRAMUSCULAR; INTRAVENOUS ONCE
Status: COMPLETED | OUTPATIENT
Start: 2019-01-17 | End: 2019-01-17

## 2019-01-16 RX ORDER — OXYTOCIN/0.9 % SODIUM CHLORIDE 30/500 ML
100 PLASTIC BAG, INJECTION (ML) INTRAVENOUS CONTINUOUS
Status: DISCONTINUED | OUTPATIENT
Start: 2019-01-16 | End: 2019-01-19 | Stop reason: HOSPADM

## 2019-01-16 RX ORDER — HYDROCORTISONE 2.5 %
CREAM (GRAM) TOPICAL 3 TIMES DAILY PRN
Status: DISCONTINUED | OUTPATIENT
Start: 2019-01-16 | End: 2019-01-19 | Stop reason: HOSPADM

## 2019-01-16 RX ORDER — DIPHENHYDRAMINE HCL 25 MG
25 CAPSULE ORAL EVERY 6 HOURS PRN
Status: DISCONTINUED | OUTPATIENT
Start: 2019-01-16 | End: 2019-01-19 | Stop reason: HOSPADM

## 2019-01-16 RX ORDER — OXYTOCIN/0.9 % SODIUM CHLORIDE 30/500 ML
PLASTIC BAG, INJECTION (ML) INTRAVENOUS CONTINUOUS PRN
Status: DISCONTINUED | OUTPATIENT
Start: 2019-01-16 | End: 2019-01-16

## 2019-01-16 RX ORDER — IBUPROFEN 400 MG/1
800 TABLET, FILM COATED ORAL EVERY 6 HOURS PRN
Status: DISCONTINUED | OUTPATIENT
Start: 2019-01-16 | End: 2019-01-19 | Stop reason: HOSPADM

## 2019-01-16 RX ORDER — NALOXONE HYDROCHLORIDE 0.4 MG/ML
.1-.4 INJECTION, SOLUTION INTRAMUSCULAR; INTRAVENOUS; SUBCUTANEOUS
Status: DISCONTINUED | OUTPATIENT
Start: 2019-01-16 | End: 2019-01-16

## 2019-01-16 RX ORDER — METHYLERGONOVINE MALEATE 0.2 MG/ML
200 INJECTION INTRAVENOUS
Status: DISCONTINUED | OUTPATIENT
Start: 2019-01-16 | End: 2019-01-16

## 2019-01-16 RX ORDER — ACETAMINOPHEN 325 MG/1
650 TABLET ORAL EVERY 4 HOURS PRN
Status: DISCONTINUED | OUTPATIENT
Start: 2019-01-16 | End: 2019-01-16

## 2019-01-16 RX ORDER — CARBOPROST TROMETHAMINE 250 UG/ML
250 INJECTION, SOLUTION INTRAMUSCULAR
Status: DISCONTINUED | OUTPATIENT
Start: 2019-01-16 | End: 2019-01-19 | Stop reason: HOSPADM

## 2019-01-16 RX ORDER — ROPIVACAINE HYDROCHLORIDE 2 MG/ML
10 INJECTION, SOLUTION EPIDURAL; INFILTRATION; PERINEURAL ONCE
Status: DISCONTINUED | OUTPATIENT
Start: 2019-01-16 | End: 2019-01-16

## 2019-01-16 RX ORDER — ONDANSETRON 2 MG/ML
INJECTION INTRAMUSCULAR; INTRAVENOUS
Status: DISCONTINUED
Start: 2019-01-16 | End: 2019-01-16 | Stop reason: HOSPADM

## 2019-01-16 RX ORDER — PROCHLORPERAZINE 25 MG
25 SUPPOSITORY, RECTAL RECTAL EVERY 12 HOURS PRN
Status: DISCONTINUED | OUTPATIENT
Start: 2019-01-16 | End: 2019-01-19 | Stop reason: HOSPADM

## 2019-01-16 RX ORDER — AMOXICILLIN 250 MG
1 CAPSULE ORAL 2 TIMES DAILY PRN
Status: DISCONTINUED | OUTPATIENT
Start: 2019-01-16 | End: 2019-01-19 | Stop reason: HOSPADM

## 2019-01-16 RX ORDER — PENICILLIN G POTASSIUM 5000000 [IU]/1
5 INJECTION, POWDER, FOR SOLUTION INTRAMUSCULAR; INTRAVENOUS ONCE
Status: COMPLETED | OUTPATIENT
Start: 2019-01-16 | End: 2019-01-16

## 2019-01-16 RX ORDER — NALBUPHINE HYDROCHLORIDE 10 MG/ML
2.5-5 INJECTION, SOLUTION INTRAMUSCULAR; INTRAVENOUS; SUBCUTANEOUS EVERY 6 HOURS PRN
Status: DISCONTINUED | OUTPATIENT
Start: 2019-01-16 | End: 2019-01-16

## 2019-01-16 RX ORDER — DIPHENHYDRAMINE HYDROCHLORIDE 50 MG/ML
25 INJECTION INTRAMUSCULAR; INTRAVENOUS EVERY 6 HOURS PRN
Status: DISCONTINUED | OUTPATIENT
Start: 2019-01-16 | End: 2019-01-19 | Stop reason: HOSPADM

## 2019-01-16 RX ORDER — SODIUM CHLORIDE, SODIUM LACTATE, POTASSIUM CHLORIDE, CALCIUM CHLORIDE 600; 310; 30; 20 MG/100ML; MG/100ML; MG/100ML; MG/100ML
INJECTION, SOLUTION INTRAVENOUS CONTINUOUS
Status: DISCONTINUED | OUTPATIENT
Start: 2019-01-16 | End: 2019-01-16

## 2019-01-16 RX ORDER — EPHEDRINE SULFATE 50 MG/ML
5 INJECTION, SOLUTION INTRAMUSCULAR; INTRAVENOUS; SUBCUTANEOUS
Status: DISCONTINUED | OUTPATIENT
Start: 2019-01-16 | End: 2019-01-16

## 2019-01-16 RX ORDER — METHYLERGONOVINE MALEATE 0.2 MG/ML
200 INJECTION INTRAVENOUS
Status: DISCONTINUED | OUTPATIENT
Start: 2019-01-16 | End: 2019-01-19 | Stop reason: HOSPADM

## 2019-01-16 RX ORDER — HYDROMORPHONE HYDROCHLORIDE 1 MG/ML
INJECTION, SOLUTION INTRAMUSCULAR; INTRAVENOUS; SUBCUTANEOUS
Status: COMPLETED
Start: 2019-01-16 | End: 2019-01-16

## 2019-01-16 RX ORDER — OXYTOCIN/0.9 % SODIUM CHLORIDE 30/500 ML
PLASTIC BAG, INJECTION (ML) INTRAVENOUS
Status: DISCONTINUED
Start: 2019-01-16 | End: 2019-01-16 | Stop reason: HOSPADM

## 2019-01-16 RX ORDER — LIDOCAINE 40 MG/G
CREAM TOPICAL
Status: DISCONTINUED | OUTPATIENT
Start: 2019-01-16 | End: 2019-01-19 | Stop reason: HOSPADM

## 2019-01-16 RX ORDER — CEFAZOLIN SODIUM 1 G/3ML
1 INJECTION, POWDER, FOR SOLUTION INTRAMUSCULAR; INTRAVENOUS SEE ADMIN INSTRUCTIONS
Status: DISCONTINUED | OUTPATIENT
Start: 2019-01-16 | End: 2019-01-16

## 2019-01-16 RX ORDER — ACETAMINOPHEN 325 MG/1
975 TABLET ORAL EVERY 8 HOURS
Status: COMPLETED | OUTPATIENT
Start: 2019-01-16 | End: 2019-01-19

## 2019-01-16 RX ORDER — NALOXONE HYDROCHLORIDE 0.4 MG/ML
.1-.4 INJECTION, SOLUTION INTRAMUSCULAR; INTRAVENOUS; SUBCUTANEOUS
Status: DISCONTINUED | OUTPATIENT
Start: 2019-01-16 | End: 2019-01-19 | Stop reason: HOSPADM

## 2019-01-16 RX ORDER — LIDOCAINE HCL/EPINEPHRINE/PF 2%-1:200K
VIAL (ML) INJECTION PRN
Status: DISCONTINUED | OUTPATIENT
Start: 2019-01-16 | End: 2019-01-16

## 2019-01-16 RX ORDER — ONDANSETRON 2 MG/ML
4 INJECTION INTRAMUSCULAR; INTRAVENOUS EVERY 6 HOURS PRN
Status: DISCONTINUED | OUTPATIENT
Start: 2019-01-16 | End: 2019-01-19 | Stop reason: HOSPADM

## 2019-01-16 RX ORDER — KETOROLAC TROMETHAMINE 30 MG/ML
30 INJECTION, SOLUTION INTRAMUSCULAR; INTRAVENOUS EVERY 6 HOURS
Status: COMPLETED | OUTPATIENT
Start: 2019-01-16 | End: 2019-01-17

## 2019-01-16 RX ORDER — OXYTOCIN/0.9 % SODIUM CHLORIDE 30/500 ML
1-24 PLASTIC BAG, INJECTION (ML) INTRAVENOUS CONTINUOUS
Status: DISCONTINUED | OUTPATIENT
Start: 2019-01-16 | End: 2019-01-16

## 2019-01-16 RX ORDER — LIDOCAINE 40 MG/G
CREAM TOPICAL
Status: DISCONTINUED | OUTPATIENT
Start: 2019-01-16 | End: 2019-01-16

## 2019-01-16 RX ORDER — ONDANSETRON 2 MG/ML
4 INJECTION INTRAMUSCULAR; INTRAVENOUS EVERY 6 HOURS PRN
Status: DISCONTINUED | OUTPATIENT
Start: 2019-01-16 | End: 2019-01-16

## 2019-01-16 RX ORDER — METOCLOPRAMIDE HYDROCHLORIDE 5 MG/ML
10 INJECTION INTRAMUSCULAR; INTRAVENOUS EVERY 6 HOURS PRN
Status: DISCONTINUED | OUTPATIENT
Start: 2019-01-16 | End: 2019-01-19 | Stop reason: HOSPADM

## 2019-01-16 RX ORDER — OXYTOCIN/0.9 % SODIUM CHLORIDE 30/500 ML
PLASTIC BAG, INJECTION (ML) INTRAVENOUS PRN
Status: DISCONTINUED | OUTPATIENT
Start: 2019-01-16 | End: 2019-01-16

## 2019-01-16 RX ORDER — ACETAMINOPHEN 325 MG/1
TABLET ORAL
Status: COMPLETED
Start: 2019-01-16 | End: 2019-01-16

## 2019-01-16 RX ORDER — MISOPROSTOL 200 UG/1
800 TABLET ORAL
Status: DISCONTINUED | OUTPATIENT
Start: 2019-01-16 | End: 2019-01-19 | Stop reason: HOSPADM

## 2019-01-16 RX ORDER — DEXTROSE, SODIUM CHLORIDE, SODIUM LACTATE, POTASSIUM CHLORIDE, AND CALCIUM CHLORIDE 5; .6; .31; .03; .02 G/100ML; G/100ML; G/100ML; G/100ML; G/100ML
INJECTION, SOLUTION INTRAVENOUS CONTINUOUS
Status: DISCONTINUED | OUTPATIENT
Start: 2019-01-16 | End: 2019-01-19 | Stop reason: HOSPADM

## 2019-01-16 RX ORDER — LIDOCAINE HCL/EPINEPHRINE/PF 2%-1:200K
VIAL (ML) INJECTION
Status: DISCONTINUED
Start: 2019-01-16 | End: 2019-01-16 | Stop reason: HOSPADM

## 2019-01-16 RX ORDER — FENTANYL CITRATE 50 UG/ML
INJECTION, SOLUTION INTRAMUSCULAR; INTRAVENOUS PRN
Status: DISCONTINUED | OUTPATIENT
Start: 2019-01-16 | End: 2019-01-16 | Stop reason: HOSPADM

## 2019-01-16 RX ORDER — OXYTOCIN/0.9 % SODIUM CHLORIDE 30/500 ML
100-340 PLASTIC BAG, INJECTION (ML) INTRAVENOUS CONTINUOUS PRN
Status: DISCONTINUED | OUTPATIENT
Start: 2019-01-16 | End: 2019-01-16

## 2019-01-16 RX ORDER — OXYTOCIN 10 [USP'U]/ML
10 INJECTION, SOLUTION INTRAMUSCULAR; INTRAVENOUS
Status: DISCONTINUED | OUTPATIENT
Start: 2019-01-16 | End: 2019-01-19 | Stop reason: HOSPADM

## 2019-01-16 RX ORDER — IBUPROFEN 400 MG/1
800 TABLET, FILM COATED ORAL
Status: DISCONTINUED | OUTPATIENT
Start: 2019-01-16 | End: 2019-01-16

## 2019-01-16 RX ORDER — SIMETHICONE 80 MG
80 TABLET,CHEWABLE ORAL 4 TIMES DAILY PRN
Status: DISCONTINUED | OUTPATIENT
Start: 2019-01-16 | End: 2019-01-19 | Stop reason: HOSPADM

## 2019-01-16 RX ORDER — BISACODYL 10 MG
10 SUPPOSITORY, RECTAL RECTAL DAILY PRN
Status: DISCONTINUED | OUTPATIENT
Start: 2019-01-18 | End: 2019-01-19 | Stop reason: HOSPADM

## 2019-01-16 RX ORDER — ONDANSETRON 2 MG/ML
INJECTION INTRAMUSCULAR; INTRAVENOUS PRN
Status: DISCONTINUED | OUTPATIENT
Start: 2019-01-16 | End: 2019-01-16

## 2019-01-16 RX ORDER — CITRIC ACID/SODIUM CITRATE 334-500MG
30 SOLUTION, ORAL ORAL
Status: COMPLETED | OUTPATIENT
Start: 2019-01-16 | End: 2019-01-16

## 2019-01-16 RX ORDER — OXYTOCIN/0.9 % SODIUM CHLORIDE 30/500 ML
340 PLASTIC BAG, INJECTION (ML) INTRAVENOUS CONTINUOUS PRN
Status: DISCONTINUED | OUTPATIENT
Start: 2019-01-16 | End: 2019-01-19 | Stop reason: HOSPADM

## 2019-01-16 RX ORDER — CARBOPROST TROMETHAMINE 250 UG/ML
250 INJECTION, SOLUTION INTRAMUSCULAR
Status: DISCONTINUED | OUTPATIENT
Start: 2019-01-16 | End: 2019-01-16

## 2019-01-16 RX ORDER — OXYCODONE HYDROCHLORIDE 5 MG/1
5-10 TABLET ORAL
Status: DISCONTINUED | OUTPATIENT
Start: 2019-01-16 | End: 2019-01-19 | Stop reason: HOSPADM

## 2019-01-16 RX ORDER — AMOXICILLIN 250 MG
2 CAPSULE ORAL 2 TIMES DAILY PRN
Status: DISCONTINUED | OUTPATIENT
Start: 2019-01-16 | End: 2019-01-19 | Stop reason: HOSPADM

## 2019-01-16 RX ORDER — OXYCODONE AND ACETAMINOPHEN 5; 325 MG/1; MG/1
1 TABLET ORAL
Status: DISCONTINUED | OUTPATIENT
Start: 2019-01-16 | End: 2019-01-16

## 2019-01-16 RX ORDER — CEFAZOLIN SODIUM 2 G/100ML
2 INJECTION, SOLUTION INTRAVENOUS
Status: COMPLETED | OUTPATIENT
Start: 2019-01-16 | End: 2019-01-16

## 2019-01-16 RX ORDER — HYDROMORPHONE HYDROCHLORIDE 1 MG/ML
.3-.5 INJECTION, SOLUTION INTRAMUSCULAR; INTRAVENOUS; SUBCUTANEOUS EVERY 30 MIN PRN
Status: DISCONTINUED | OUTPATIENT
Start: 2019-01-16 | End: 2019-01-19 | Stop reason: HOSPADM

## 2019-01-16 RX ORDER — ACETAMINOPHEN 325 MG/1
650 TABLET ORAL EVERY 4 HOURS PRN
Status: DISCONTINUED | OUTPATIENT
Start: 2019-01-19 | End: 2019-01-19 | Stop reason: HOSPADM

## 2019-01-16 RX ORDER — FENTANYL CITRATE 50 UG/ML
100 INJECTION, SOLUTION INTRAMUSCULAR; INTRAVENOUS
Status: DISCONTINUED | OUTPATIENT
Start: 2019-01-16 | End: 2019-01-16

## 2019-01-16 RX ORDER — ROPIVACAINE HYDROCHLORIDE 2 MG/ML
INJECTION, SOLUTION EPIDURAL; INFILTRATION; PERINEURAL PRN
Status: DISCONTINUED | OUTPATIENT
Start: 2019-01-16 | End: 2019-01-16 | Stop reason: HOSPADM

## 2019-01-16 RX ADMIN — OXYTOCIN-SODIUM CHLORIDE 0.9% IV SOLN 30 UNIT/500ML 100 ML/HR: 30-0.9/5 SOLUTION at 22:06

## 2019-01-16 RX ADMIN — PHENYLEPHRINE HYDROCHLORIDE 100 MCG: 10 INJECTION, SOLUTION INTRAMUSCULAR; INTRAVENOUS; SUBCUTANEOUS at 19:06

## 2019-01-16 RX ADMIN — OXYTOCIN-SODIUM CHLORIDE 0.9% IV SOLN 30 UNIT/500ML 2 MILLI-UNITS/MIN: 30-0.9/5 SOLUTION at 07:56

## 2019-01-16 RX ADMIN — Medication 12 ML/HR: at 13:54

## 2019-01-16 RX ADMIN — LIDOCAINE HYDROCHLORIDE,EPINEPHRINE BITARTRATE 3 ML: 15; .005 INJECTION, SOLUTION EPIDURAL; INFILTRATION; INTRACAUDAL; PERINEURAL at 13:50

## 2019-01-16 RX ADMIN — PHENYLEPHRINE HYDROCHLORIDE 100 MCG: 10 INJECTION, SOLUTION INTRAMUSCULAR; INTRAVENOUS; SUBCUTANEOUS at 19:23

## 2019-01-16 RX ADMIN — Medication 5 MG: at 14:23

## 2019-01-16 RX ADMIN — PENICILLIN G POTASSIUM 5 MILLION UNITS: 5000000 POWDER, FOR SOLUTION INTRAMUSCULAR; INTRAPLEURAL; INTRATHECAL; INTRAVENOUS at 07:56

## 2019-01-16 RX ADMIN — FENTANYL CITRATE 100 MCG: 50 INJECTION, SOLUTION INTRAMUSCULAR; INTRAVENOUS at 13:56

## 2019-01-16 RX ADMIN — ROPIVACAINE HYDROCHLORIDE 3 ML: 2 INJECTION, SOLUTION EPIDURAL; INFILTRATION at 13:56

## 2019-01-16 RX ADMIN — SODIUM CHLORIDE 2.5 MILLION UNITS: 9 INJECTION, SOLUTION INTRAVENOUS at 15:24

## 2019-01-16 RX ADMIN — LIDOCAINE HYDROCHLORIDE,EPINEPHRINE BITARTRATE 14 ML: 20; .005 INJECTION, SOLUTION EPIDURAL; INFILTRATION; INTRACAUDAL; PERINEURAL at 19:00

## 2019-01-16 RX ADMIN — PHENYLEPHRINE HYDROCHLORIDE 100 MCG: 10 INJECTION, SOLUTION INTRAMUSCULAR; INTRAVENOUS; SUBCUTANEOUS at 19:27

## 2019-01-16 RX ADMIN — CEFAZOLIN SODIUM 2 G: 2 INJECTION, SOLUTION INTRAVENOUS at 19:00

## 2019-01-16 RX ADMIN — Medication 5 MG: at 14:08

## 2019-01-16 RX ADMIN — KETOROLAC TROMETHAMINE 30 MG: 30 INJECTION, SOLUTION INTRAMUSCULAR at 20:36

## 2019-01-16 RX ADMIN — ACETAMINOPHEN 975 MG: 325 TABLET, FILM COATED ORAL at 21:04

## 2019-01-16 RX ADMIN — Medication 5 MG: at 14:27

## 2019-01-16 RX ADMIN — ONDANSETRON 4 MG: 2 INJECTION INTRAMUSCULAR; INTRAVENOUS at 19:02

## 2019-01-16 RX ADMIN — HYDROMORPHONE HYDROCHLORIDE 0.3 MG: 1 INJECTION, SOLUTION INTRAMUSCULAR; INTRAVENOUS; SUBCUTANEOUS at 21:37

## 2019-01-16 RX ADMIN — OXYTOCIN-SODIUM CHLORIDE 0.9% IV SOLN 30 UNIT/500ML 1000 ML/HR: 30-0.9/5 SOLUTION at 19:19

## 2019-01-16 RX ADMIN — SODIUM CHLORIDE, POTASSIUM CHLORIDE, SODIUM LACTATE AND CALCIUM CHLORIDE: 600; 310; 30; 20 INJECTION, SOLUTION INTRAVENOUS at 19:37

## 2019-01-16 RX ADMIN — PHENYLEPHRINE HYDROCHLORIDE 100 MCG: 10 INJECTION, SOLUTION INTRAMUSCULAR; INTRAVENOUS; SUBCUTANEOUS at 19:41

## 2019-01-16 RX ADMIN — SODIUM CHLORIDE, POTASSIUM CHLORIDE, SODIUM LACTATE AND CALCIUM CHLORIDE 500 ML: 600; 310; 30; 20 INJECTION, SOLUTION INTRAVENOUS at 13:34

## 2019-01-16 RX ADMIN — PHENYLEPHRINE HYDROCHLORIDE 100 MCG: 10 INJECTION, SOLUTION INTRAMUSCULAR; INTRAVENOUS; SUBCUTANEOUS at 19:05

## 2019-01-16 RX ADMIN — SODIUM CHLORIDE, POTASSIUM CHLORIDE, SODIUM LACTATE AND CALCIUM CHLORIDE: 600; 310; 30; 20 INJECTION, SOLUTION INTRAVENOUS at 07:56

## 2019-01-16 RX ADMIN — Medication 5 MG: at 14:30

## 2019-01-16 RX ADMIN — SODIUM CITRATE AND CITRIC ACID MONOHYDRATE 30 ML: 500; 334 SOLUTION ORAL at 18:53

## 2019-01-16 RX ADMIN — SODIUM CHLORIDE 2.5 MILLION UNITS: 9 INJECTION, SOLUTION INTRAVENOUS at 11:36

## 2019-01-16 RX ADMIN — SODIUM CHLORIDE 500 MG: 9 INJECTION, SOLUTION INTRAVENOUS at 19:11

## 2019-01-16 RX ADMIN — ROPIVACAINE HYDROCHLORIDE 5 ML: 2 INJECTION, SOLUTION EPIDURAL; INFILTRATION at 13:53

## 2019-01-16 ASSESSMENT — MIFFLIN-ST. JEOR: SCORE: 1476.11

## 2019-01-16 NOTE — H&P
Pt is 27yo  EIOL at 39+2weeks  Opos/ RI/ GBS pos and started Pen G at 8am  H/o PPROM at 34 weeks with G1 and took Elisabet this pregnancy  H/o GHTN but good BP's this pregnancy  tdap flu given  EFW 7.5#  toco irreg ctx's   reactive no decels, mod variability    PLAN: Pen G  Pit ind  AROM when able for adequate GBS tx  CHITO if desires  NVANNY Burton, DO

## 2019-01-16 NOTE — PLAN OF CARE
Multip at 39 weeks here for an elective induction. POC discussed with patient and . Monitors applied, assessment obtained. Oriented to call light and room.

## 2019-01-16 NOTE — PLAN OF CARE
1510: Prolonged deceleration , repositioned side to side, Oxygen admijnistered. 1516: Pitocin turned off. 1524; Dr. Burton at bedside to evaluate.

## 2019-01-16 NOTE — ANESTHESIA PROCEDURE NOTES
Peripheral nerve/Neuraxial procedure note : epidural catheter  Pre-Procedure  Performed by Fatou Castillo MD  Location: OB      Pre-Anesthestic Checklist: patient identified, IV checked, site marked, risks and benefits discussed, informed consent, monitors and equipment checked, pre-op evaluation and at physician/surgeon's request    Timeout  Correct Patient: Yes   Correct Procedure: Yes   Correct Site: Yes   Correct Laterality: Yes   Correct Position: Yes   Site Marked: Yes   .   Procedure Documentation    .    Procedure:    Epidural catheter.  Insertion Site:L2-3  (midline approach) Injection technique: LORT saline and LORT air   Local skin infiltrated with 1 mL of 1% lidocaine.       Patient Prep;povidone-iodine 7.5% surgical scrub.  .  Needle: Touhy needle Needle Gauge: 17.    Needle Length (Inches) 3.5  # of attempts: 1 and # of redirects:  .   Catheter: 19 G . .  Catheter threaded easily  .  .   .    Assessment/Narrative  Paresthesias: No.  .  .  Aspiration negative for heme or CSF  . Test dose of 3 mL lidocaine 1.5% w/ 1:200,000 epinephrine at. Test dose negative for signs of intravascular, subdural or intrathecal injection. Comments:  Pre-procedure time out completed. Patient in sitting position, the lumbar spine was prepped and draped in sterile fashion. The L2/L3 interspace was identified and local anesthetic was injected for local skin infiltration. A 17 G touhy needle was advanced to the epidural space which was confirmed with the loss of resistance technique at 6 cm. A catheter was then advanced easily into the epidural space. The catheter was left at 10 cm at the skin. Negative aspiration of blood and CSF was confirmed. A test dose of 1.5% lidocaine with 1:200,000 epinephrine was injected through the catheter and was negative for intravascular injection. The site was covered with sterile tegaderm and the catheter was secured with tape.

## 2019-01-17 LAB — HGB BLD-MCNC: 9.7 G/DL (ref 11.7–15.7)

## 2019-01-17 PROCEDURE — 36415 COLL VENOUS BLD VENIPUNCTURE: CPT | Performed by: OBSTETRICS & GYNECOLOGY

## 2019-01-17 PROCEDURE — 12000035 ZZH R&B POSTPARTUM

## 2019-01-17 PROCEDURE — 85018 HEMOGLOBIN: CPT | Performed by: OBSTETRICS & GYNECOLOGY

## 2019-01-17 PROCEDURE — 25000132 ZZH RX MED GY IP 250 OP 250 PS 637: Performed by: OBSTETRICS & GYNECOLOGY

## 2019-01-17 PROCEDURE — 25000128 H RX IP 250 OP 636: Performed by: OBSTETRICS & GYNECOLOGY

## 2019-01-17 RX ADMIN — OXYCODONE HYDROCHLORIDE 5 MG: 5 TABLET ORAL at 14:12

## 2019-01-17 RX ADMIN — KETOROLAC TROMETHAMINE 30 MG: 30 INJECTION, SOLUTION INTRAMUSCULAR at 08:10

## 2019-01-17 RX ADMIN — SENNOSIDES AND DOCUSATE SODIUM 1 TABLET: 8.6; 5 TABLET ORAL at 08:10

## 2019-01-17 RX ADMIN — IBUPROFEN 800 MG: 400 TABLET ORAL at 20:11

## 2019-01-17 RX ADMIN — ACETAMINOPHEN 975 MG: 325 TABLET, FILM COATED ORAL at 12:04

## 2019-01-17 RX ADMIN — ACETAMINOPHEN 975 MG: 325 TABLET, FILM COATED ORAL at 04:11

## 2019-01-17 RX ADMIN — SODIUM CHLORIDE, SODIUM LACTATE, POTASSIUM CHLORIDE, CALCIUM CHLORIDE AND DEXTROSE MONOHYDRATE: 5; 600; 310; 30; 20 INJECTION, SOLUTION INTRAVENOUS at 03:15

## 2019-01-17 RX ADMIN — KETOROLAC TROMETHAMINE 30 MG: 30 INJECTION, SOLUTION INTRAMUSCULAR at 14:02

## 2019-01-17 RX ADMIN — OXYCODONE HYDROCHLORIDE 5 MG: 5 TABLET ORAL at 19:13

## 2019-01-17 RX ADMIN — ACETAMINOPHEN 975 MG: 325 TABLET, FILM COATED ORAL at 20:11

## 2019-01-17 RX ADMIN — SENNOSIDES AND DOCUSATE SODIUM 1 TABLET: 8.6; 5 TABLET ORAL at 20:11

## 2019-01-17 RX ADMIN — OXYCODONE HYDROCHLORIDE 5 MG: 5 TABLET ORAL at 08:56

## 2019-01-17 RX ADMIN — CEFAZOLIN SODIUM 1 G: 1 INJECTION, POWDER, FOR SOLUTION INTRAMUSCULAR; INTRAVENOUS at 03:15

## 2019-01-17 RX ADMIN — HYDROMORPHONE HYDROCHLORIDE 0.3 MG: 1 INJECTION, SOLUTION INTRAMUSCULAR; INTRAVENOUS; SUBCUTANEOUS at 06:13

## 2019-01-17 RX ADMIN — OXYCODONE HYDROCHLORIDE 5 MG: 5 TABLET ORAL at 23:12

## 2019-01-17 RX ADMIN — KETOROLAC TROMETHAMINE 30 MG: 30 INJECTION, SOLUTION INTRAMUSCULAR at 02:21

## 2019-01-17 NOTE — PROGRESS NOTES
"[facility] Obstetrics Post-Op Progress Note  2019    S: Doing well.  Pain is well controlled but feels she will try oxycodone. Bleeding Light. Infant is being .  Has been out of bed and doing Jarvis in place. Tolerating regular diet. Passing gas, no BM yet.    O:  /76   Pulse 103   Temp 98.4  F (36.9  C)   Resp 16   Ht 1.626 m (5' 4\")   Wt 77.1 kg (170 lb)   SpO2 97%   Breastfeeding? Unknown   BMI 29.18 kg/m     Gen: healthy, alert and no distress   Resp: Nonlabored breathing  Abd: soft, non-distended, appropriately TTP, FF at -1  Incision: Dressing in place  Ext: non-tender, trace edema    Hemoglobin   Date Value Ref Range Status   2019 11.8 11.7 - 15.7 g/dL Final   2015 11.7 11.7 - 15.7 g/dL Final     Lab Results   Component Value Date    ABO O 2019        Lab Results   Component Value Date    RH Pos 2019   , No results found for: RUBELLAABIGG    A: 28 year old  POD#1 s/p PCS for arrest of descent.   P:   Routine post-operative care  HGB pending    Litzy Montes   Obstetrics, Gynecology, and Infertility    "

## 2019-01-17 NOTE — LACTATION NOTE
.Initial visit with KATLYN Damon and baby boy.     Breastfeeding general information reviewed.   Advised to breastfeed exclusively, on demand, avoid pacifiers, bottles and formula unless medically indicated.  Encouraged rooming in, skin to skin, feeding on demand 8-12x/day or sooner if baby cues.  Explained benefits of holding and skin to skin.  Encouraged lots of skin to skin. Instructed on hand expression.   Continues to nurse well per mom. No further questions at this time.   Will follow as needed.   Natividad Madrigal BSN, RN, PHN, RNC-MNN, IBCLC

## 2019-01-17 NOTE — ANESTHESIA PREPROCEDURE EVALUATION
"Anesthesia Pre-Procedure Evaluation    Patient: Marisol Banks   MRN: [id number] : 1990          Preoperative Diagnosis: pregnancy-failure to desend    Procedure(s):   SECTION    Past Medical History:   Diagnosis Date     Chest pain     evaluated years ago, thought anxiety, normal Holter, normal EKG     Hypertension     \"white coat\" work ups normal     Indication for care in labor or delivery 2015      (normal spontaneous vaginal delivery) 2015     Postpartum care and examination immediately after delivery 2015     Past Surgical History:   Procedure Laterality Date     LAPAROSCOPIC OOPHORECTOMY  2008    dermoid cyst, --actually had to do a low transverse incision     wisdom teeth extraction         Anesthesia Evaluation     . Pt has had prior anesthetic.            ROS/MED HX    ENT/Pulmonary:      (-) sleep apnea   Neurologic:       Cardiovascular:     (+) hypertension----. : . . . :. .       METS/Exercise Tolerance:     Hematologic:         Musculoskeletal:         GI/Hepatic:     (+) GERD       Renal/Genitourinary:         Endo:         Psychiatric:         Infectious Disease:         Malignancy:         Other:                          Physical Exam  Normal systems: cardiovascular, pulmonary and dental    Airway   Mallampati: II  TM distance: >3 FB  Neck ROM: full    Dental   (+) lower retainer    Cardiovascular       Pulmonary             Lab Results   Component Value Date    WBC 9.6 2015    HGB 11.7 2015    HCT 32.7 (L) 2015     2019     2015    POTASSIUM 4.5 2015    CHLORIDE 109 2015    CO2 23 2015    BUN 16 2015    CR 1.06 (H) 2015    GLC 73 2015    TRISH 8.7 2015    ALT 22 2015    AST 31 2015    HCG Negative 2014       Preop Vitals  BP Readings from Last 3 Encounters:   19 127/60   18 129/73   18 144/82    Pulse Readings from Last 3 Encounters:   18 " "105   01/14/18 88   01/27/17 98      Resp Readings from Last 3 Encounters:   01/16/19 18   11/01/18 16   05/31/15 16    SpO2 Readings from Last 3 Encounters:   01/16/19 100%   01/14/18 100%   01/27/17 100%      Temp Readings from Last 1 Encounters:   01/16/19 36.7  C (98  F) (Temporal)    Ht Readings from Last 1 Encounters:   01/16/19 1.626 m (5' 4\")      Wt Readings from Last 1 Encounters:   01/16/19 77.1 kg (170 lb)    Estimated body mass index is 29.18 kg/m  as calculated from the following:    Height as of this encounter: 1.626 m (5' 4\").    Weight as of this encounter: 77.1 kg (170 lb).       Anesthesia Plan      History & Physical Review  History and physical reviewed and following examination; no interval change.    ASA Status:  2 .        Plan for Epidural   PONV prophylaxis:  Ondansetron (or other 5HT-3)  Plan on using existing epidural;      Postoperative Care  Postoperative pain management:  IV analgesics.      Consents  Anesthetic plan, risks, benefits and alternatives discussed with:  Patient..                 CARLITO MARTINEZ MD  "

## 2019-01-17 NOTE — ANESTHESIA POSTPROCEDURE EVALUATION
Patient: Marisol Banks    * No procedures listed *    Diagnosis:* No pre-op diagnosis entered *  Diagnosis Additional Information: No value filed.    Anesthesia Type:  No value filed.    Note:  Anesthesia Post Evaluation    Patient location during evaluation: OB PACU  Patient participation: Able to fully participate in evaluation     Anesthetic complications: None    Comments: Used for  and removed following by CRNA; no apparent complications;        Last vitals:  Vitals:    19 2045 19 2100 19 2115   BP: 129/83 131/87 137/78   Pulse: 108 104 104   Resp: 25 16 16   Temp:      SpO2: 100% 99% 100%         Electronically Signed By: CARLITO MARTINEZ MD  2019  9:35 PM

## 2019-01-17 NOTE — PLAN OF CARE
Patient transferred from labor and delivery at 2200. Report taken from Deirdre VAIL RN. Safety and security reviewed. VSS. Fundus firm and midline. Small flow with check to none with message. Pain 2/10, taking tylenol and toradol. Dressing CDI. Breastfeeding. Jarvis patent, adequate output. Bowel sounds hypoactive, not passing gas. Encouraged to call with needs, questions, or concerns. Will continue to monitor.

## 2019-01-17 NOTE — ANESTHESIA POSTPROCEDURE EVALUATION
Patient: Marisol Banks    Procedure(s):   SECTION    Diagnosis:pregnancy-failure to desend  Diagnosis Additional Information: No value filed.    Anesthesia Type:  Epidural    Note:  Anesthesia Post Evaluation    Patient location during evaluation: OB PACU  Patient participation: Able to participate in evaluation but full recovery from regional anesthesia has not yet ocurrred but is anticipated to occur within 48 hours  Level of consciousness: awake and alert  Pain management: adequate  Airway patency: patent  Cardiovascular status: acceptable  Respiratory status: acceptable  Hydration status: acceptable  PONV: none             Last vitals:  Vitals:    19 2045 19 2100 19 2115   BP: 129/83 131/87 137/78   Pulse: 108 104 104   Resp: 25 16 16   Temp:      SpO2: 100% 99% 100%         Electronically Signed By: CARLITO MARTINEZ MD  2019  9:34 PM

## 2019-01-17 NOTE — PLAN OF CARE
Dr. Burton in attendance for all of pushing.  section called at 1835. Patient transferred to OR in bed at 1855. See delivery summary and flowsheet for details. Report given to Deirdre Bowers in OR at 1913.

## 2019-01-17 NOTE — PLAN OF CARE
VSS, fundus firm, scant flow, felton draining adequately, removed at 1415-awaiting void.  Ambulating independently frequently. Breastfeeding encouraged at least 8x in 24 hours, going well. Pain well controlled with tylenol, toradol, calling when needing oxycodone, using abdominal binder. Will continue to monitor.

## 2019-01-17 NOTE — PROGRESS NOTES
Pt is 27yo  at 39+2  She became complete at 5pm and we started pushing at approx 515pm  She has great maternal effort and the fetal station has remained the same  We tried pushing on her side as well as with the towel, and the handles and again no further decent of the fetal head. We had also increased the pitocin to 9mU as well to try to encourage increased uterine strength  FHT's are now mildly tachycardic with 's and ctx's were Q 2-3 min  I dw pt the risks of infection, bleeding, and  and pt have agreed to proceed. Consent is signed    Oriana Burton DO

## 2019-01-17 NOTE — PROCEDURES
C/S OP REPORT      Preoperative Diagnosis:   1. 28 year old  at 39wks  2. Failure to Progress with complete dilation and 1 hour of pushing        Postoperative Diagnosis:  1. Same  2. Delivery of male infant        Procedure: Primary Low Transverse  section       Surgeon: Oriana Burton DO      Assistants: JAYSON Jerome RN      Implants/grafts: None      Specimen: None      Complications: none      Condition: Stable      Anesthesia: Epidural      Findings: Normal appearing left ovary and fallopian tube. Absent right adnexa. Normal appearing uterus.  Male infant in cephalic presentation weighing 7#7oz, Apgars of 9/9.       Indications:  32 year old  at 39w presented for EIOL and had failure to progress after one hour of pushing with great maternal effort.      During a preoperative consultation the patient was counseled about the risks and benefits of the procedure and the alternatives to  section. Patient was then further consented about the risks of the procedure to include but not limited to bleeding, infection, injury to other organs and anesthesia and advised that if any one of these things happen she may need further surgery. Patient is also advised of the rare risk of hysterectomy secondary to bleeding. Patient was given the opportunity to have her questions answered prior to the procedure.      OPERATIVE INDICATION AND DESCRIPTION   The patient was taken to the operating room and spinal anesthesia was placed.  The patient was prepped and draped in the normal sterile fashion in dorsal supine position with a leftward tilt.      After adequate anesthesia, a horizontal skin incision was made around her old phannenstiel incision and discarded and then carried down to the fascia, which was incised horizontally.  The rectus muscles were freed for the overlying fascia and  in the midline, and the anterior peritoneum opened avoiding the bladder.  A low segment  transverse uterine incision was made.The infant was delivered in the cepahlic presentation without difficulty, and handed off to the team in attendance after a 30 second cord clamping delay.  The infant did well.The placenta delivered spontaneously.   The uterus was removed from the abdomen and closed in two layers.  The first layer using 0-Vicryl suture in a running locked fashion.  The second layer using 0-Monocryl in an imbricating fashion was placed incorporating the bladder flap peritoneum with care to avoid injury to the bladder.       There was no bleeding noted at any time.  The pelvis was inspected and suction irrigated. The uterus was hemostatic and returned to the abdomen.  The uterus was again inspected and was hemostatic. The anterior peritoneum was hemostatic and closed in a running fashion with 3-0 vicryl suture.  The rectus muscles were approximated with interrupted 0 vicryl sutures placed approximately 2 cm apart.  The fascia was closed in running fashion with #0 Vicryl.  The subcutaneous tissue was irrigated and then was closed with 2-0 plain in a running fashion. I irrigated the skin and made the tissue hemostatic with electrocautery. The skin was closed with staples.  Steri strips and a sterile dressing was applied.  Sponge and needle counts were correct.      Quantitative Blood Loss  600.  The patient tolerated the procedure well and was taken to the recovery area in good condition.      Oriana Burton DO

## 2019-01-17 NOTE — ANESTHESIA CARE TRANSFER NOTE
Patient: Marisol Banks    Procedure(s):   SECTION    Diagnosis: pregnancy-failure to desend  Diagnosis Additional Information: No value filed.    Anesthesia Type:   Epidural     Note:  Airway :Room Air  Patient transferred to:PACU  Comments: Transferred to L&D PACU, spontaneous respirations, on room air.  All monitors and alarms on and functioning, VSS.  Patient awake, comfortable.  Report given to L&D PACU RN.Handoff Report: Identifed the Patient, Identified the Reponsible Provider, Reviewed the pertinent medical history, Discussed the surgical course, Reviewed Intra-OP anesthesia mangement and issues during anesthesia, Set expectations for post-procedure period and Allowed opportunity for questions and acknowledgement of understanding      Vitals: (Last set prior to Anesthesia Care Transfer)    CRNA VITALS  2019 1928 - 2019 2005      2019             Resp Rate (set):  10                Electronically Signed By: MARIVEL Vieyra CRNA  2019  8:05 PM

## 2019-01-18 PROCEDURE — 12000035 ZZH R&B POSTPARTUM

## 2019-01-18 PROCEDURE — 25000132 ZZH RX MED GY IP 250 OP 250 PS 637: Performed by: OBSTETRICS & GYNECOLOGY

## 2019-01-18 RX ORDER — OXYCODONE HYDROCHLORIDE 5 MG/1
5-10 TABLET ORAL EVERY 4 HOURS PRN
Qty: 20 TABLET | Refills: 0 | Status: SHIPPED | OUTPATIENT
Start: 2019-01-18 | End: 2020-12-14

## 2019-01-18 RX ORDER — ACETAMINOPHEN 325 MG/1
650 TABLET ORAL EVERY 4 HOURS PRN
Qty: 40 TABLET | Refills: 0 | Status: SHIPPED | OUTPATIENT
Start: 2019-01-19 | End: 2020-12-14

## 2019-01-18 RX ORDER — IBUPROFEN 800 MG/1
800 TABLET, FILM COATED ORAL EVERY 6 HOURS PRN
Qty: 40 TABLET | Refills: 0 | Status: SHIPPED | OUTPATIENT
Start: 2019-01-18 | End: 2020-12-14

## 2019-01-18 RX ORDER — FERROUS SULFATE 325(65) MG
325 TABLET ORAL
Qty: 30 TABLET | Refills: 0 | Status: SHIPPED | OUTPATIENT
Start: 2019-01-18 | End: 2019-02-17

## 2019-01-18 RX ORDER — AMOXICILLIN 250 MG
1 CAPSULE ORAL 2 TIMES DAILY PRN
Qty: 60 TABLET | Refills: 0 | Status: SHIPPED | OUTPATIENT
Start: 2019-01-18 | End: 2020-12-14

## 2019-01-18 RX ADMIN — IBUPROFEN 800 MG: 400 TABLET ORAL at 08:46

## 2019-01-18 RX ADMIN — ACETAMINOPHEN 975 MG: 325 TABLET, FILM COATED ORAL at 04:22

## 2019-01-18 RX ADMIN — IBUPROFEN 800 MG: 400 TABLET ORAL at 03:05

## 2019-01-18 RX ADMIN — SENNOSIDES AND DOCUSATE SODIUM 2 TABLET: 8.6; 5 TABLET ORAL at 07:15

## 2019-01-18 RX ADMIN — IBUPROFEN 800 MG: 400 TABLET ORAL at 20:42

## 2019-01-18 RX ADMIN — SENNOSIDES AND DOCUSATE SODIUM 2 TABLET: 8.6; 5 TABLET ORAL at 20:42

## 2019-01-18 RX ADMIN — IBUPROFEN 800 MG: 400 TABLET ORAL at 14:44

## 2019-01-18 RX ADMIN — OXYCODONE HYDROCHLORIDE 5 MG: 5 TABLET ORAL at 07:14

## 2019-01-18 RX ADMIN — ACETAMINOPHEN 975 MG: 325 TABLET, FILM COATED ORAL at 20:42

## 2019-01-18 RX ADMIN — ACETAMINOPHEN 975 MG: 325 TABLET, FILM COATED ORAL at 12:27

## 2019-01-18 RX ADMIN — OXYCODONE HYDROCHLORIDE 5 MG: 5 TABLET ORAL at 03:05

## 2019-01-18 NOTE — PLAN OF CARE
VSS.  Incision WNL, fundus firm, scant bleeding.  Up in room independently, voiding adequately.  Pain well controlled with tylenol, ibuprofen and 5 mg oxycodone.  Breastfeeding well. Mother and father independent with  cares. . Questions encouraged.  On pathway. Continue to monitor and notify MD as needed.

## 2019-01-18 NOTE — PLAN OF CARE
VSS. Fundus firm and midline. Scant flow. Pain 2/10, taking tylenol, ibuprofen, and oxycodone. Dressing CDI. Breastfeeding. Ambulating free of dizziness and lightheaded. Voiding without difficulty. Encouraged to call with needs, questions, or concerns. Will continue to monitor.

## 2019-01-18 NOTE — PLAN OF CARE
Patient doing well, up ambulating the hallways without difficulty.  Complains of mild incisional discomfort 2/10.  Incision well approximated with steri strips and staples.  Fundus firm below umbilicus, lochia scant.  Breast pump given per patient request.  Using mother's love cream prn tender nipples.  Independent with self and infant cares.  Plan on discharging home tomorrow.

## 2019-01-18 NOTE — PROGRESS NOTES
"Postpartum Progress Note  Marisol Banks  [id number]    Subjective:   Marisol is feeling well this morning. Pain is well controlled with oral meds. She has been voiding without difficulty. Ambulating without dizziness. Eating and drinking well without nausea or vomiting. Passing flatus, no bowel movement yet.     Baby is breastfeeding and latching really well.     Objective:  /86   Pulse 98   Temp 98.4  F (36.9  C) (Oral)   Resp 16   Ht 1.626 m (5' 4\")   Wt 77.1 kg (170 lb)   SpO2 96%   Breastfeeding? Unknown   BMI 29.18 kg/m      I/O last 3 completed shifts:  In: 1650 [P.O.:1650]  Out: 1100 [Urine:1100]    General: NAD, sitting comfortably, alert  Heart: regular rate  Lungs: nonlabored breathing  Abdomen: soft, non distended, appropriately tender to palpation. Fundus firm below U.   Incision:  incision clean, dry and intact with staples and steri strips, no erythema or drainage   Extremities: warm and well perfused, trace edema in LE, nontender    Assessment/Plan:  Marisol Banks is a 28 year old  who is POD#2 s/p PLTCS for arrest of descent.  Currently stable and doing well.  - Routine post-op cares  - pain well controlled with oral meds  - Continue bowel regimen  - Heme: Hgb 11.8 >  mL> 9.7, asymptomatic. Will discharge home on iron in addition to prenatal vitamin.  - Rh positive, no Rhogam indicated, Rubella immune  - Remove staples tomorrow prior to DC  - Baby: doing well, breastfeeding  - Dispo: d/c to home on POD#3, orders done    Hannah Lowery MD  OB/GYN & Infertility  19      "

## 2019-01-19 VITALS
WEIGHT: 170 LBS | OXYGEN SATURATION: 96 % | BODY MASS INDEX: 29.02 KG/M2 | HEART RATE: 98 BPM | TEMPERATURE: 98.2 F | SYSTOLIC BLOOD PRESSURE: 149 MMHG | DIASTOLIC BLOOD PRESSURE: 89 MMHG | RESPIRATION RATE: 16 BRPM | HEIGHT: 64 IN

## 2019-01-19 LAB
ALT SERPL W P-5'-P-CCNC: 21 U/L (ref 0–50)
AST SERPL W P-5'-P-CCNC: 43 U/L (ref 0–45)
CREAT SERPL-MCNC: 0.58 MG/DL (ref 0.52–1.04)
ERYTHROCYTE [DISTWIDTH] IN BLOOD BY AUTOMATED COUNT: 13.7 % (ref 10–15)
GFR SERPL CREATININE-BSD FRML MDRD: >90 ML/MIN/{1.73_M2}
HCT VFR BLD AUTO: 28 % (ref 35–47)
HGB BLD-MCNC: 9.3 G/DL (ref 11.7–15.7)
MCH RBC QN AUTO: 28.5 PG (ref 26.5–33)
MCHC RBC AUTO-ENTMCNC: 33.2 G/DL (ref 31.5–36.5)
MCV RBC AUTO: 86 FL (ref 78–100)
PLATELET # BLD AUTO: 268 10E9/L (ref 150–450)
RBC # BLD AUTO: 3.26 10E12/L (ref 3.8–5.2)
WBC # BLD AUTO: 9.8 10E9/L (ref 4–11)

## 2019-01-19 PROCEDURE — 85027 COMPLETE CBC AUTOMATED: CPT | Performed by: OBSTETRICS & GYNECOLOGY

## 2019-01-19 PROCEDURE — 36415 COLL VENOUS BLD VENIPUNCTURE: CPT | Performed by: OBSTETRICS & GYNECOLOGY

## 2019-01-19 PROCEDURE — 84460 ALANINE AMINO (ALT) (SGPT): CPT | Performed by: OBSTETRICS & GYNECOLOGY

## 2019-01-19 PROCEDURE — 25000132 ZZH RX MED GY IP 250 OP 250 PS 637: Performed by: OBSTETRICS & GYNECOLOGY

## 2019-01-19 PROCEDURE — 82565 ASSAY OF CREATININE: CPT | Performed by: OBSTETRICS & GYNECOLOGY

## 2019-01-19 PROCEDURE — 84450 TRANSFERASE (AST) (SGOT): CPT | Performed by: OBSTETRICS & GYNECOLOGY

## 2019-01-19 RX ADMIN — IBUPROFEN 800 MG: 400 TABLET ORAL at 02:27

## 2019-01-19 RX ADMIN — SENNOSIDES AND DOCUSATE SODIUM 1 TABLET: 8.6; 5 TABLET ORAL at 08:48

## 2019-01-19 RX ADMIN — ACETAMINOPHEN 975 MG: 325 TABLET, FILM COATED ORAL at 12:34

## 2019-01-19 RX ADMIN — ACETAMINOPHEN 975 MG: 325 TABLET, FILM COATED ORAL at 04:16

## 2019-01-19 RX ADMIN — IBUPROFEN 800 MG: 400 TABLET ORAL at 08:48

## 2019-01-19 NOTE — PROGRESS NOTES
"Postpartum Progress Note  Marisol Banks  [id number]    Subjective:   Marisol is feeling well this morning. Pain is well controlled with oral meds. She has been voiding without difficulty. Ambulating without dizziness. Eating and drinking well without nausea or vomiting. Passing flatus, no bowel movement yet. Her BP's have been creeping up since delivery and now in the 140's/90's.  Has a h/o GHTN with previous pregnancy however no issues with this pregnancy.  She feels very good and denies headaches, vision changes or epigastric/RUQ pain.    Baby is breastfeeding and latching really well.     Objective:  BP (!) 142/99   Pulse 98   Temp 98.2  F (36.8  C) (Oral)   Resp 16   Ht 1.626 m (5' 4\")   Wt 77.1 kg (170 lb)   SpO2 96%   Breastfeeding? Unknown   BMI 29.18 kg/m      No intake/output data recorded.    General: NAD, sitting comfortably, alert  Abdomen: soft, non distended, appropriately tender to palpation. Fundus firm below U. Mild rash on abdomen  Incision:  incision clean, dry and intact with steri strips in place, no erythema or drainage   Extremities: warm and well perfused, mild edema in LE bilaterally similar, nontender    Assessment/Plan:  Marisol Banks is a 28 year old  who is POD#3 s/p PLTCS for arrest of descent.  BP's recently more elevated in the 140/90's; history of gestational hypertension in previous pregnancy.    - Routine post-op cares.  Encourage ambulation.  - PIH LABS ORDERED. WILL WAIT FOR DISCHARGE UNTIL THESE ARE BACK.  Discussed with patient if these are normal and her BP stays in the mild range, I am okay with her discharging today (she really wants to go) with close follow up in clinic early this week for BP check.  She understands that if she were to have any PIH symptoms after discharge, she should call.  - pain well controlled with oral meds  - Continue bowel regimen  - Heme: Hgb 11.8 >  mL> 9.7, asymptomatic. Will discharge home on iron in addition to prenatal " vitamin.  - Rh positive, no Rhogam indicated, Rubella immune  - Baby: doing well, breastfeeding  - Dispo: Likely discharge later today if labs are normal and BP stable in the mild range.  Follow up on Monday or Tuesday in clinic for BP check.    Mayra Muller MD  Obstetrics, Gynecology and Infertility

## 2019-01-19 NOTE — PLAN OF CARE
B/p elevated, other VSS. Voiding without difficulty. Scant vaginal bleeding. Incision WDL. Pain controlled with ibuprofen and tylenol. Using abd binder for comfort. Breastfeeding infant independently. Will continue to monitor.

## 2019-01-19 NOTE — LACTATION NOTE
Follow up visit.  Infant has continued to feed well.  L nipple has a bruise and is sore from a bad latch on the first day.  No further damage since.  Latching well.  Marisol feels her milk is starting to come in more today, breasts are feeling alcala.  Discussed introduction of bottle and pumping.  Marisol gave baby a pacifier last night since baby was feeding all night but her prefers to be at the breast.  Reviewed outpatient lactation resources for follow up if needed.  Marisol was really excited that baby was breast feeding so well since her first baby never did.  She had no other questions today and is hoping to discharge to home if able.   Jessica Hassan  RN, IBCLC

## 2019-01-19 NOTE — PLAN OF CARE
Patient's BP's elevated, labs drawn this afternoon, called to MD.  Patient denies headache, vision changes and epigastric pain.  Plan to follow up in clinic for BP check on Monday or Tuesday of next week.  Staples removed at incision site, incision well approximated, replaced a few steri strips.  Discharge instructions explained to patient and all questions/concerns addressed.  Prescriptions given and breast pump as well.

## 2019-01-19 NOTE — PLAN OF CARE
Patient meeting expected goals for this shift.  Using ibuprofen & tylenol for pain/comfort.  Requesting oxycodone as needed.  Independent in all self and  cares.  Working on breastfeeding .   present at bedside and supportive.  Positive bonding behaviors observed.  Continue to monitor and notify MD as needed.

## 2019-01-19 NOTE — DISCHARGE INSTRUCTIONS
Preeclampsia   Call your doctor right away if you have any of the following:  - Edema (swelling) in your face or hands  - Rapid weight gain-about 1 pound or more in a day  - Headache  - Abdominal pain on your right side  - Vision problems (flashes or spots)  - You have questions or concerns once you return home.    Postop  Birth Instructions    Activity       Do not lift more than 10 pounds for 6 weeks after surgery.  Ask family and friends for help when you need it.    No driving until you have stopped taking your pain medications (usually two weeks after surgery).    No heavy exercise or activity for 6 weeks.  Don't do anything that will put a strain on your surgery site.    Don't strain when using the toilet.  Your care team may prescribe a stool softener if you have problems with your bowel movements.     To care for your incision:       Keep the incision clean and dry.    Do not soak your incision in water. No swimming or hot tubs until it has fully healed. You may soak in the bathtub if the water level is below your incision.    Do not use peroxide, gel, cream, lotion, or ointment on your incision.    Adjust your clothes to avoid pressure on your surgery site (check the elastic in your underwear for example).     You may see a small amount of clear or pink drainage and this is normal.  Check with your health care provider:       If the drainage increases or has an odor.    If the incision reddens, you have swelling, or develop a rash.    If you have increased pain and the medicine we prescribed doesn't help.    If you have a fever above 100.4 F (38 C) with or without chills when placing thermometer under your tongue.   The area around your incision (surgery wound), will feel numb.  This is normal. The numbness should go away in less than a year.     Keep your hands clean:  Always wash your hands before touching your incision (surgery wound). This helps reduce your risk of infection. If your hands  aren't dirty, you may use an alcohol hand-rub to clean your hands. Keep your nails clean and short.    Call your healthcare provider if you have any of these symptoms:       You soak a sanitary pad with blood within 1 hour, or you see blood clots larger than a golf ball.    Bleeding that lasts more than 6 weeks.    Vaginal discharge that smells bad.    Severe pain, cramping or tenderness in your lower belly area.    A need to urinate more frequently (use the toilet more often), more urgently (use the toilet very quickly), or it burns when you urinate.    Nausea and vomiting.    Redness, swelling or pain around a vein in your leg.    Problems breastfeeding or a red or painful area on your breast.    Chest pain and cough or are gasping for air.    Problems with coping with sadness, anxiety or depression. If you have concerns about hurting yourself or the baby, call your provider immediately.      You have questions or concerns after you return home.

## 2019-01-19 NOTE — PROGRESS NOTES
/93. Pt denies HA, epigastric pain, visual changes. 2+ edema. Pain 1/10 with Tylenol and Motrin. Pt ambulating in room independently.

## 2019-02-04 NOTE — DISCHARGE SUMMARY
Date of Admission: 1/16/19    Date of Discharge: 1/19/19    Admission Diagnosis: EIOL, GBS pos, h/o GHTN    Discharge Diagnosis: same, P LTCS for male    Operations/Procedures Performed: P LTCS    Complications: none    Hospital Course: The patient was admitted for EIOL however she was not able to push past zero station. Please see operative report for further details. EBL was 600. Hemoglobin on POD#1 was 9.7. Patient was discharged home on POD# 3. At the time of discharge, she was voiding, ambulating and tolerating a regular diet. Her pain was well controlled with PO pain meds. Staples were removed prior to discharge.    Discharge Plans:  1. Pt was instructed to call with pain not controlled with PO pain meds, temp > 100.4, bleeding > 1 pad/hr for 2hrs, or concerns about her incision.  2. Pt was instructed no lifting > 10lbs x6wks, no driving x2 wks or while on narcotics, and pelvic rest x6 wks.  3. Pt will return to clinic in 2 wks for post-op check and 6 wks for postpartum check.    Oriana Burton

## 2019-05-16 ENCOUNTER — VIRTUAL VISIT (OUTPATIENT)
Dept: FAMILY MEDICINE | Facility: OTHER | Age: 29
End: 2019-05-16

## 2019-05-16 NOTE — PROGRESS NOTES
"Date:   Clinician: Fidel Leavitt  Clinician NPI: [id number]  Patient: Marisol Banks  Patient : 1990  Patient Address: [address] [place] [address]  Patient Phone: [phone]  Visit Protocol: Yeast infection  Patient Summary:  Marisol is a 29 year old ( : 1990 ) female who initiated a Visit for a presumed vaginal yeast infection. When asked the question \"Please sign me up to receive news, health information and promotions from [employer].\", Marisol responded \"No\".    Marisol began noticing vaginal burning, vaginal pruritus, and vaginal irritation 1-3 days ago.   She denies having blisters, vaginal discharge, open sores, and abdominal pain. She also denies feeling feverish.   She has not tried to treat her current symptoms with any medication.   Precipitating events  Marisol denies having a sexually transmitted disease.   Pertinent medical history  Marisol has a history of vaginal yeast infections. She has had one (1) occurrence in the past year and the current symptoms are similar to previous yeast infections. She is not sure if she has used fluconazole (Diflucan) to treat previous yeast infections.   She prefers a pill. She denies taking antibiotics in the past 2 weeks.   She does NOT smoke or use smokeless tobacco.   She denies pregnancy and is breastfeeding. Her last period was over a month ago.   Additional information as reported by the patient (free text): I know I answered the question about breastfeeding, but I want to be very sure the medication is breastfeeding safe.    MEDICATIONS: ibuprofen oral, ALLERGIES: NKDA  Clinician Response:  Dear Marisol,  Based on the information you have provided, you likely have a vaginal yeast infection which is a common infection of the vagina caused by a fungus. Yeast are a type of fungus.  The most common symptom of a yeast infection is itching in and around the vagina. Other signs and symptoms include burning, redness, or a thick, white " vaginal discharge that looks like cottage cheese and does not have a bad smell.  Medication information  I am prescribing:     Fluconazole (Diflucan) 150 mg oral tablet. Take 1 tablet by mouth in a single dose. There are no refills with this prescription.   Self care  Steps you can take to prevent symptoms of future vaginal yeast infection:     Avoid irritants such as scented bath products, tampons, pads, or vaginal sprays and powders    Avoid douching    Wear cotton underwear and if you are comfortable doing so, do not wear underwear to bed    Avoid hot tubs and whirlpool spas     When to seek care  Most women notice improvement in their symptoms within 1-2 days after starting treatment with complete clearing in 5-7 days.  Please make an appointment to be seen in a clinic or urgent care if any of the following occur:     Your symptoms have not improved in 3 days or not resolved in 10 days    You develop new symptoms or your symptoms become worse    If you think you may be at risk for an STD      Diagnosis: Candida vulvovaginitis  Diagnosis ICD: B37.3  Prescription: fluconazole (Diflucan) 150 mg oral tablet 1 tablet, 1 days supply. Take 1 tablet by mouth in a single dose. Refills: 0, Refill as needed: no, Allow substitutions: yes  [id number] - [phone] - [address] [place] [address]

## 2020-03-02 ENCOUNTER — HEALTH MAINTENANCE LETTER (OUTPATIENT)
Age: 30
End: 2020-03-02

## 2020-07-24 ENCOUNTER — VIRTUAL VISIT (OUTPATIENT)
Dept: FAMILY MEDICINE | Facility: OTHER | Age: 30
End: 2020-07-24

## 2020-07-24 NOTE — PROGRESS NOTES
"Date: 2020 10:41:55  Clinician: Jerardo Antonio  Clinician NPI: [id number]  Patient: Marisol Banks  Patient : 1990  Patient Address: [address] [place] [address]  Patient Phone: [phone]  Visit Protocol: Yeast infection  Patient Summary:  Marisol is a 30 year old ( : 1990 ) female who initiated a Visit for a presumed vaginal yeast infection. When asked the question \"Please sign me up to receive news, health information and promotions from [employer].\", Marisol responded \"No\".    Marisol began noticing vaginal irritation and vaginal pruritus 7-14 days ago.   She denies having open sores, vaginal discharge, vaginal burning, abdominal pain, and blisters. She also denies feeling feverish.   She has not tried to treat her current symptoms with any medication.   Precipitating events  Marisol denies having a sexually transmitted disease.   Pertinent medical history  Marisol has a history of vaginal yeast infections. She has had two (2) occurrences in the past year and the current symptoms are similar to previous yeast infections. She has used fluconazole (Diflucan) to treat previous yeast infections. 2 doses of fluconazole (Diflucan) has typically been needed for symptoms to resolve in the past.  She prefers a pill. She denies taking antibiotics in the past 2 weeks.   She does NOT smoke or use smokeless tobacco.   She denies pregnancy and denies breastfeeding. She does not menstruate.      MEDICATIONS: ibuprofen oral, ALLERGIES: NKDA  Clinician Response:  Dear Marisol,  Based on the information you have provided, you likely have a vaginal yeast infection which is a common infection of the vagina caused by a fungus. Yeast are a type of fungus.  The most common symptom of a yeast infection is itching in and around the vagina. Other signs and symptoms include burning, redness, or a thick, white vaginal discharge that looks like cottage cheese and does not have a bad smell.  Medication information  " I am prescribing:     Fluconazole (Diflucan) 150 mg oral tablet. Take 1 tablet by mouth in a single dose. Repeat dose in 3 days if symptoms are still present. There are no refills with this prescription.   Self care  Steps you can take to prevent symptoms of future vaginal yeast infection:     Avoid irritants such as scented bath products, tampons, pads, or vaginal sprays and powders    Avoid douching    Wear cotton underwear and if you are comfortable doing so, do not wear underwear to bed    Avoid hot tubs and whirlpool spas     When to seek care  Most women notice improvement in their symptoms within 1-2 days after starting treatment with complete clearing in 5-7 days.  Please make an appointment to be seen in a clinic or urgent care if any of the following occur:     Your symptoms have not improved in 3 days or not resolved in 10 days    You develop new symptoms or your symptoms become worse    If you think you may be at risk for an STD      Diagnosis: Candida vulvovaginitis  Diagnosis ICD: B37.3  Prescription: fluconazole (Diflucan) 150 mg oral tablet 2 tablet, 4 days supply. Take 1 tablet by mouth in a single dose, repeat dose in 3 days if symptoms are still present. Refills: 0, Refill as needed: no, Allow substitutions: yes  [id number] - [phone] - [address],  [place] [address]

## 2020-11-15 ENCOUNTER — VIRTUAL VISIT (OUTPATIENT)
Dept: FAMILY MEDICINE | Facility: OTHER | Age: 30
End: 2020-11-15
Payer: COMMERCIAL

## 2020-11-15 PROCEDURE — 99421 OL DIG E/M SVC 5-10 MIN: CPT | Performed by: PHYSICIAN ASSISTANT

## 2020-11-15 NOTE — PROGRESS NOTES
"Date: 11/15/2020 12:24:50  Clinician: Janell Cook  Clinician NPI: [id number]  Patient: Marisol Banks  Patient : 1990  Patient Address: [address] [place] [address]  Patient Phone: [phone]  Visit Protocol: Yeast infection  Patient Summary:  Marisol is a 30 year old ( : 1990 ) female who initiated a [facility] Visit for a presumed vaginal yeast infection. When asked the question \"Please sign me up to receive news, health information and promotions from [facility].\", Marisol responded \"No\".    Marisol began noticing vaginal pruritus, vaginal irritation, and vaginal burning 7-14 days ago.   She denies having open sores, vaginal discharge, abdominal pain, and blisters. She also denies feeling feverish.   She has attempted to treat her symptoms with anti-fungal cream for yeast infections. Anti-fungal medication used to treat symptoms as reported by the patient (free text): Monistat, not effective    Precipitating events  Marisol denies having a sexually transmitted disease.   Pertinent medical history  Marisol has a history of vaginal yeast infections. She has had two (2) occurrences in the past year and the current symptoms are similar to previous yeast infections. She has used fluconazole (Diflucan) to treat previous yeast infections. 2 doses of fluconazole (Diflucan) has typically been needed for symptoms to resolve in the past.  She has no medication preference. She denies taking antibiotics in the past 2 weeks.   She does NOT smoke or use smokeless tobacco.   She denies pregnancy and denies breastfeeding. She is currently menstruating.      MEDICATIONS: ibuprofen oral, ALLERGIES: NKDA  Clinician Response:  Dear Marisol,  Based on the information you have provided, you likely have a vaginal yeast infection which is a common infection of the vagina caused by a fungus. Yeast are a type of fungus.  The most common symptom of a yeast infection is itching in and around the vagina. Other signs and " symptoms include burning, redness, or a thick, white vaginal discharge that looks like cottage cheese and does not have a bad smell.  Medication information  I am prescribing:     Fluconazole (Diflucan) 150 mg oral tablet. Take 1 tablet by mouth in a single dose. Repeat dose in 3 days if symptoms are still present. There are no refills with this prescription.   Self care  Steps you can take to prevent symptoms of future vaginal yeast infection:     Avoid irritants such as scented bath products, tampons, pads, or vaginal sprays and powders    Avoid douching    Wear cotton underwear and if you are comfortable doing so, do not wear underwear to bed    Avoid hot tubs and whirlpool spas     When to seek care  Most women notice improvement in their symptoms within 1-2 days after starting treatment with complete clearing in 5-7 days.  Please make an appointment to be seen in a clinic or urgent care if any of the following occur:     Your symptoms have not improved in 3 days or not resolved in 10 days    You develop new symptoms or your symptoms become worse    If you think you may be at risk for an STD      Diagnosis: Candida vulvovaginitis  Diagnosis ICD: B37.3  Prescription: fluconazole (Diflucan) 150 mg oral tablet 2 tablet, 4 days supply. Take 1 tablet by mouth in a single dose, repeat dose in 3 days if symptoms are still present. Refills: 0, Refill as needed: no, Allow substitutions: yes  [id number] - [phone] - [address] [place] [address]

## 2020-11-20 ENCOUNTER — E-VISIT (OUTPATIENT)
Dept: URGENT CARE | Facility: CLINIC | Age: 30
End: 2020-11-20
Payer: COMMERCIAL

## 2020-11-20 DIAGNOSIS — Z20.822 SUSPECTED COVID-19 VIRUS INFECTION: ICD-10-CM

## 2020-11-20 DIAGNOSIS — Z20.822 SUSPECTED COVID-19 VIRUS INFECTION: Primary | ICD-10-CM

## 2020-11-20 PROCEDURE — 99207 PR NO CHARGE LOS: CPT | Performed by: FAMILY MEDICINE

## 2020-11-20 PROCEDURE — U0003 INFECTIOUS AGENT DETECTION BY NUCLEIC ACID (DNA OR RNA); SEVERE ACUTE RESPIRATORY SYNDROME CORONAVIRUS 2 (SARS-COV-2) (CORONAVIRUS DISEASE [COVID-19]), AMPLIFIED PROBE TECHNIQUE, MAKING USE OF HIGH THROUGHPUT TECHNOLOGIES AS DESCRIBED BY CMS-2020-01-R: HCPCS | Performed by: FAMILY MEDICINE

## 2020-11-20 NOTE — PATIENT INSTRUCTIONS
Dear Marisol Banks,    Your symptoms show that you may have coronavirus (COVID-19). This illness can cause fever, cough and trouble breathing. Many people get a mild case and get better on their own. Some people can get very sick.    Will I be tested for COVID-19?  We would like to test you for Covid-19 virus. I have placed orders for this test.     For all employees or close contacts (except [place] and [facility] - see below), go to your [employer] home page and scroll down to the section that says  You have an appointment that needs to be scheduled  and click the large green button that says  Schedule Now  and follow the steps to find the next available opening.     If you are unable to complete these steps or if you cannot find any available times, please call [phone] to schedule employee testing.       [facility] employees or close contacts, please call [phone].   [place] ([facility]) employees or close contacts call [phone].    When it's time for your COVID test:  Stay at least 6 feet away from others. (If someone will drive you to your test, stay in the backseat, as far away from the  as you can.)  Cover your mouth and nose with a mask, tissue or washcloth.  Go straight to the testing site. Don't make any stops on the way there or back.    Starting now:     Do not go to work.   o If you receive a negative COVID-19 test result and were NOT exposed to someone with a known positive COVID-19 test, you can return to work once you're free of fever for 24 hours without fever-reducing medication and your symptoms are improving or resolved.  o If you receive a positive COVID-19 test result, you must be cleared by Employee Occupational Health and Safety to return to work.   o If you were exposed to someone who has tested positive for COVID-19, you can return to work 14 days after your last contact with the positive individual, provided you do not have symptoms at all during that time. In some cases,  "your manager may ask you to come back sooner than 14 days.     During this time, don't leave the house except for testing or medical care.  o Stay in your own room, even for meals. Use your own bathroom if you can.  o Stay away from others in your home. No hugging, kissing or shaking hands. No visitors.  o Don't go to work, school or anywhere else.    Clean \"high touch\" surfaces often (doorknobs, counters, handles, etc.). Use a household cleaning spray or wipes. You'll find a full list of  on the EPA website: www.epa.gov/pesticide-registration/list-n-disinfectants-use-against-sars-cov-2.    Cover your mouth and nose with a mask, tissue or washcloth to avoid spreading germs.    Wash your hands and face often. Use soap and water.    People in these groups are at risk for severe illness due to COVID-19:  o People 65 years and older  o People who live in a nursing home or long-term care facility  o People with chronic disease (lung, heart, cancer, diabetes, kidney, liver, immunologic)  o People who have a weakened immune system, including those who:  - Are in cancer treatment  - Take medicine that weakens the immune system, such as corticosteroids  - Had a bone marrow or organ transplant  - Have an immune deficiency  - Have poorly controlled HIV or AIDS  - Are obese (body mass index of 40 or higher)  - Smoke regularly      Caregivers should wear gloves while washing dishes, handling laundry and cleaning bedrooms and bathrooms.    Use caution when washing and drying laundry: Don't shake dirty laundry, and use the warmest water setting that you can.    For more tips, go to www.cdc.gov/coronavirus/2019-ncov/downloads/10Things.pdf.    Sign up for [employer]. We know it's scary to hear that you might have COVID-19. We want to track your symptoms to make sure you're okay over the next 2 weeks. Please look for an email from [employer]--this is a free, online program that we'll use to keep in touch. To sign up, " follow the link in the email you will receive. Learn more at [email or web address]    How can I take care of myself?    Get lots of rest. Drink extra fluids (unless a doctor has told you not to)    Take Tylenol (acetaminophen) for fever or pain. If you have liver or kidney problems, ask your family doctor if it's okay to take Tylenol.  Adults can take either:    650 mg (two 325 mg pills) every 4 to 6 hours, or     1,000 mg (two 500 mg pills) every 8 hours as needed.    Note: Don't take more than 3,000 mg in one day. Acetaminophen is found in many medicines (both prescribed and over-the-counter medicines). Read all labels to be sure you don't take too much.  For children, check the Tylenol bottle for the right dose. The dose is based on the child's age or weight.    If you have other health problems (like cancer, heart failure, an organ transplant or severe kidney disease): Call your specialty clinic if you don't feel better in the next 2 days.    Know when to call 911. Emergency warning signs include:  Trouble breathing or shortness of breath  Pain or pressure in the chest that doesn't go away  Feeling confused like you haven't felt before, or not being able to wake up  Bluish-colored lips or face    Where can I get more information?    [facility] - About COVID-19: [email or web address]  CDC - What to Do If You're Sick: www.cdc.gov/coronavirus/2019-ncov/about/steps-when-sick.html  November 20, 2020    RE:  Marisol Lemuslund                                                                                                                                                       [address] [facility] [address]        To whom it may concern:    I evaluated Marisol Banks on 11/20/20. Marisol Banks should be excused from work/school.     Do not go to work.      If you receive a negative COVID-19 test result and were NOT exposed to someone with a known positive COVID-19 test, you can  return to work once you're free of fever for 24 hours without fever-reducing medication and your symptoms are improving or resolved.    If you receive a positive COVID-19 test result, you must be cleared by Employee Occupational Health and Safety to return to work.     If you were exposed to someone who has tested positive for COVID-19, you can return to work 14 days after your last contact with the positive individual, provided you do not have symptoms at all during that time. In some cases, your manager may ask you to come back sooner than 14 days.       Sincerely,  Dorie Medina MD

## 2020-11-21 LAB
SARS-COV-2 RNA SPEC QL NAA+PROBE: NOT DETECTED
SPECIMEN SOURCE: NORMAL

## 2020-12-09 ENCOUNTER — E-VISIT (OUTPATIENT)
Dept: FAMILY MEDICINE | Facility: CLINIC | Age: 30
End: 2020-12-09
Payer: COMMERCIAL

## 2020-12-09 DIAGNOSIS — N89.8 VAGINAL DISCHARGE: Primary | ICD-10-CM

## 2020-12-09 PROCEDURE — 99207 PR NON-BILLABLE SERV PER CHARTING: CPT | Performed by: FAMILY MEDICINE

## 2020-12-09 NOTE — PATIENT INSTRUCTIONS
Please schedule a clinic visit so we can complete testing to determine the best course of treatment since your symptoms have not improved.

## 2020-12-14 ENCOUNTER — OFFICE VISIT (OUTPATIENT)
Dept: FAMILY MEDICINE | Facility: CLINIC | Age: 30
End: 2020-12-14
Payer: COMMERCIAL

## 2020-12-14 VITALS
OXYGEN SATURATION: 97 % | WEIGHT: 142.2 LBS | HEART RATE: 79 BPM | RESPIRATION RATE: 13 BRPM | SYSTOLIC BLOOD PRESSURE: 137 MMHG | BODY MASS INDEX: 24.28 KG/M2 | DIASTOLIC BLOOD PRESSURE: 82 MMHG | TEMPERATURE: 96.6 F | HEIGHT: 64 IN

## 2020-12-14 DIAGNOSIS — N76.0 ACUTE VAGINITIS: Primary | ICD-10-CM

## 2020-12-14 DIAGNOSIS — N76.0 BV (BACTERIAL VAGINOSIS): ICD-10-CM

## 2020-12-14 DIAGNOSIS — B96.89 BV (BACTERIAL VAGINOSIS): ICD-10-CM

## 2020-12-14 LAB
SPECIMEN SOURCE: ABNORMAL
WET PREP SPEC: ABNORMAL

## 2020-12-14 PROCEDURE — 99213 OFFICE O/P EST LOW 20 MIN: CPT | Performed by: NURSE PRACTITIONER

## 2020-12-14 PROCEDURE — 87210 SMEAR WET MOUNT SALINE/INK: CPT | Performed by: NURSE PRACTITIONER

## 2020-12-14 RX ORDER — METRONIDAZOLE 500 MG/1
500 TABLET ORAL 2 TIMES DAILY
Qty: 14 TABLET | Refills: 0 | Status: SHIPPED | OUTPATIENT
Start: 2020-12-14 | End: 2020-12-21

## 2020-12-14 ASSESSMENT — MIFFLIN-ST. JEOR: SCORE: 1340.01

## 2020-12-14 NOTE — PATIENT INSTRUCTIONS
Butt paste cream.    Mix equal parts hydrocortisone, desitin and lotrimin in a covered container.  Apply liberally to affected area several times a day.      Continue probiotics    Your wet prep shows that you have bacterial vaginosis (overgrowth of a bacteria called clue cells).  This is not a sexually transmitted disease.  I sent in a prescription for Flagyl which is an antibiotic that you will take twice daily for one week.  You cannot drink alcohol while taking this medication.

## 2020-12-14 NOTE — PROGRESS NOTES
"Subjective     Marisol Banks is a 30 year old female who presents to clinic today for the following health issues:    HPI        Vaginal Symptoms      Duration: 1 month     Description  itching and yeast infections     Intensity:  moderate    Accompanying signs and symptoms (fever/dysuria/abdominal or back pain): None    History  Sexually active: yes, single partner, contraception - condoms  Possibility of pregnancy: No  Recent antibiotic use: no     Precipitating or alleviating factors: None    Therapies tried and outcome: coconut oil and rx Outcome:  none     Has not noticed any odor.        Review of Systems   Constitutional, HEENT, cardiovascular, pulmonary, gi and gu systems are negative, except as otherwise noted.      Objective    /82 (BP Location: Right arm, Patient Position: Chair, Cuff Size: Adult Regular)   Pulse 79   Temp 96.6  F (35.9  C) (Tympanic)   Resp 13   Ht 1.626 m (5' 4\")   Wt 64.5 kg (142 lb 3.2 oz)   LMP  (LMP Unknown)   SpO2 97%   BMI 24.41 kg/m    Body mass index is 24.41 kg/m .  Physical Exam   GENERAL: healthy, alert and no distress  RESP: lungs clear to auscultation - no rales, rhonchi or wheezes  CV: regular rate and rhythm, normal S1 S2, no S3 or S4, no murmur, click or rub, no peripheral edema and peripheral pulses strong   (female): normal female external genitalia, normal urethral meatus, vaginal mucosa, normal cervix/adnexa/uterus without masses or discharge  MS: no gross musculoskeletal defects noted, no edema    Results for orders placed or performed in visit on 12/14/20   Wet prep     Status: Abnormal    Specimen: Vagina   Result Value Ref [facility]    Specimen Description Vagina     Wet Prep Few  Clue cells seen   (A)     Wet Prep Moderate  WBC'S seen       Wet Prep No Trichomonas seen     Wet Prep No yeast seen            Assessment & Plan     Acute vaginitis  Wet prep positive.    - Wet prep    BV (bacterial vaginosis)  Your wet prep shows that you have " bacterial vaginosis (overgrowth of a bacteria called clue cells).  This is not a sexually transmitted disease.  I sent in a prescription for Flagyl which is an antibiotic that you will take twice daily for one week.  You cannot drink alcohol while taking this medication.     - metroNIDAZOLE (FLAGYL) 500 MG tablet  Dispense: 14 tablet; Refill: 0            No follow-ups on file.    MARIVEL Allen CNP  [facility]

## 2021-04-18 ENCOUNTER — HEALTH MAINTENANCE LETTER (OUTPATIENT)
Age: 31
End: 2021-04-18

## 2021-10-03 ENCOUNTER — HEALTH MAINTENANCE LETTER (OUTPATIENT)
Age: 31
End: 2021-10-03

## 2021-11-23 ENCOUNTER — VIRTUAL VISIT (OUTPATIENT)
Dept: FAMILY MEDICINE | Facility: CLINIC | Age: 31
End: 2021-11-23
Payer: COMMERCIAL

## 2021-11-23 DIAGNOSIS — J06.9 UPPER RESPIRATORY TRACT INFECTION, UNSPECIFIED TYPE: Primary | ICD-10-CM

## 2021-11-23 PROCEDURE — 99213 OFFICE O/P EST LOW 20 MIN: CPT | Mod: 95 | Performed by: PHYSICIAN ASSISTANT

## 2021-11-23 RX ORDER — BENZONATATE 200 MG/1
200 CAPSULE ORAL 3 TIMES DAILY PRN
Qty: 21 CAPSULE | Refills: 0 | Status: SHIPPED | OUTPATIENT
Start: 2021-11-23 | End: 2021-11-30

## 2021-11-23 RX ORDER — AZITHROMYCIN 250 MG/1
TABLET, FILM COATED ORAL
Qty: 6 TABLET | Refills: 0 | Status: SHIPPED | OUTPATIENT
Start: 2021-11-23 | End: 2022-08-12

## 2021-11-23 ASSESSMENT — ENCOUNTER SYMPTOMS
VOMITING: 0
SORE THROAT: 1
LEG PAIN: 0
PND: 0
ORTHOPNEA: 0
SWOLLEN GLANDS: 1
RHINORRHEA: 1
SPUTUM PRODUCTION: 1
CLAUDICATION: 0
WHEEZING: 0
SYNCOPE: 0
HEADACHES: 1
HEMOPTYSIS: 0
NECK PAIN: 0
FEVER: 0
ABDOMINAL PAIN: 0

## 2021-11-23 NOTE — LETTER
[facility]  [address] [address]  Phone: [phone]    November 23, 2021        Marisol Banks  [address] [facility] [place] [address]          To whom it may concern:    RE: Marisol Banks    Patient was seen and treated today at our clinic and missed work.from 11/20 until 11/29    Please contact me for questions or concerns.      Sincerely,        Franky Flynn PA-C

## 2021-11-23 NOTE — PROGRESS NOTES
Marisol is a 31 year old who is being evaluated via a billable video visit.      How would you like to obtain your AVS? MyChart  If the video visit is dropped, the invitation should be resent by: Text to cell phone: [phone]  Will anyone else be joining your video visit? No    Video Start Time: 9:39 AM    Assessment & Plan     Upper respiratory tract infection, unspecified type  Most likely viral, with the long holiday weekend, did send antibiotic in case symptoms start to get worse.  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  Work note provided.  Has PCR covid test in process.  - benzonatate (TESSALON) 200 MG capsule; Take 1 capsule (200 mg) by mouth 3 times daily as needed  - azithromycin (ZITHROMAX) 250 MG tablet; Two tablets first day, then one tablet daily for four days.                 No follow-ups on file.    Franky Flynn PA-C  [facility]   Marisol is a 31 year old who presents for the following health issues     History of Present Illness       She eats 2-3 servings of fruits and vegetables daily.She consumes 1 sweetened beverage(s) daily.She exercises with enough effort to increase her heart rate 20 to 29 minutes per day.  She exercises with enough effort to increase her heart rate 3 or less days per week.   She is taking medications regularly.     Answers for HPI/ROS submitted by the patient on 11/23/2021  Chronicity: new  Onset: yesterday  Frequency: intermittently  Progression since onset: unchanged  Episode duration: 2 days  claudication: No  coryza: Yes  hemoptysis: No  leg pain: No  leg swelling: No  orthopnea: No  PND: No  sputum production: Yes  swollen glands: Yes  syncope: No  Aggravating factors: URIs      Acute Illness  Acute illness concerns: Cough   Onset/Duration: yesterday   Symptoms:  Fever: no  Chills/Sweats: no  Headache (location?): YES  Sinus Pressure: YES  Conjunctivitis:  no  Ear  Pain: no  Rhinorrhea: YES- stuffy nose   Congestion: no  Sore Throat: YES   Cough: YES-productive   Wheeze: no  Decreased Appetite: no  Nausea: no  Vomiting: no  Diarrhea: no  Dysuria/Freq.: no  Dysuria or Hematuria: no  Fatigue/Achiness: no  Sick/Strep Exposure: no  Therapies tried and outcome: OTC cough syrup      Review of Systems   Constitutional, HEENT, cardiovascular, pulmonary, gi and gu systems are negative, except as otherwise noted.      Objective           Vitals:  No vitals were obtained today due to virtual visit.    Physical Exam   GENERAL: Healthy, alert and no distress  EYES: Eyes grossly normal to inspection.  No discharge or erythema, or obvious scleral/conjunctival abnormalities.  RESP: No audible wheeze, cough, or visible cyanosis.  No visible retractions or increased work of breathing.    SKIN: Visible skin clear. No significant rash, abnormal pigmentation or lesions.  NEURO: Cranial nerves grossly intact.  Mentation and speech appropriate for age.  PSYCH: Mentation appears normal, affect normal/bright, judgement and insight intact, normal speech and appearance well-groomed.                Video-Visit Details    Type of service:  Video Visit    Video End Time:9:51 AM    Originating Location (pt. Location): Home    Distant Location (provider location):  [facility]     Platform used for Video Visit: [employer]

## 2022-05-14 ENCOUNTER — HEALTH MAINTENANCE LETTER (OUTPATIENT)
Age: 32
End: 2022-05-14

## 2022-08-12 ENCOUNTER — E-VISIT (OUTPATIENT)
Dept: URGENT CARE | Facility: CLINIC | Age: 32
End: 2022-08-12
Payer: COMMERCIAL

## 2022-08-12 DIAGNOSIS — R05.9 COUGH: Primary | ICD-10-CM

## 2022-08-12 PROCEDURE — 99207 PR NON-BILLABLE SERV PER CHARTING: CPT | Performed by: EMERGENCY MEDICINE

## 2022-08-12 NOTE — PATIENT INSTRUCTIONS
Dear Marisol Banks,    It is important that your lungs be examined because of this persistent coughing.      We are sorry you are not feeling well. Based on the responses you provided, it is recommended that you be seen in-person in urgent care so we can better evaluate your symptoms. Please click here to find the nearest urgent care location to you.   You will not be charged for this Visit. Thank you for trusting us with your care.    Brody Clrake MD

## 2022-08-15 ENCOUNTER — ANCILLARY PROCEDURE (OUTPATIENT)
Dept: GENERAL RADIOLOGY | Facility: CLINIC | Age: 32
End: 2022-08-15
Attending: PHYSICIAN ASSISTANT
Payer: COMMERCIAL

## 2022-08-15 ENCOUNTER — OFFICE VISIT (OUTPATIENT)
Dept: URGENT CARE | Facility: URGENT CARE | Age: 32
End: 2022-08-15
Payer: COMMERCIAL

## 2022-08-15 VITALS
OXYGEN SATURATION: 98 % | SYSTOLIC BLOOD PRESSURE: 136 MMHG | HEART RATE: 88 BPM | WEIGHT: 150 LBS | DIASTOLIC BLOOD PRESSURE: 70 MMHG | BODY MASS INDEX: 25.61 KG/M2 | HEIGHT: 64 IN | RESPIRATION RATE: 15 BRPM | TEMPERATURE: 98.3 F

## 2022-08-15 DIAGNOSIS — R05.9 COUGH: Primary | ICD-10-CM

## 2022-08-15 DIAGNOSIS — J40 BRONCHITIS: ICD-10-CM

## 2022-08-15 DIAGNOSIS — J98.01 BRONCHOSPASM: ICD-10-CM

## 2022-08-15 DIAGNOSIS — R05.9 COUGH: ICD-10-CM

## 2022-08-15 LAB
BASOPHILS # BLD AUTO: 0 10E3/UL (ref 0–0.2)
BASOPHILS NFR BLD AUTO: 0 %
EOSINOPHIL # BLD AUTO: 0.1 10E3/UL (ref 0–0.7)
EOSINOPHIL NFR BLD AUTO: 1 %
ERYTHROCYTE [DISTWIDTH] IN BLOOD BY AUTOMATED COUNT: 12.6 % (ref 10–15)
HCT VFR BLD AUTO: 36 % (ref 35–47)
HGB BLD-MCNC: 12.2 G/DL (ref 11.7–15.7)
IMM GRANULOCYTES # BLD: 0 10E3/UL
IMM GRANULOCYTES NFR BLD: 0 %
LYMPHOCYTES # BLD AUTO: 1.9 10E3/UL (ref 0.8–5.3)
LYMPHOCYTES NFR BLD AUTO: 28 %
MCH RBC QN AUTO: 28.2 PG (ref 26.5–33)
MCHC RBC AUTO-ENTMCNC: 33.9 G/DL (ref 31.5–36.5)
MCV RBC AUTO: 83 FL (ref 78–100)
MONOCYTES # BLD AUTO: 0.5 10E3/UL (ref 0–1.3)
MONOCYTES NFR BLD AUTO: 7 %
NEUTROPHILS # BLD AUTO: 4.4 10E3/UL (ref 1.6–8.3)
NEUTROPHILS NFR BLD AUTO: 64 %
PLATELET # BLD AUTO: 386 10E3/UL (ref 150–450)
RBC # BLD AUTO: 4.32 10E6/UL (ref 3.8–5.2)
TSH SERPL DL<=0.005 MIU/L-ACNC: 2.27 MU/L (ref 0.4–4)
WBC # BLD AUTO: 6.9 10E3/UL (ref 4–11)

## 2022-08-15 PROCEDURE — 36415 COLL VENOUS BLD VENIPUNCTURE: CPT | Performed by: PHYSICIAN ASSISTANT

## 2022-08-15 PROCEDURE — 99214 OFFICE O/P EST MOD 30 MIN: CPT | Performed by: PHYSICIAN ASSISTANT

## 2022-08-15 PROCEDURE — 85025 COMPLETE CBC W/AUTO DIFF WBC: CPT | Performed by: PHYSICIAN ASSISTANT

## 2022-08-15 PROCEDURE — 84443 ASSAY THYROID STIM HORMONE: CPT | Performed by: PHYSICIAN ASSISTANT

## 2022-08-15 PROCEDURE — 71046 X-RAY EXAM CHEST 2 VIEWS: CPT | Mod: TC | Performed by: RADIOLOGY

## 2022-08-15 RX ORDER — BENZONATATE 200 MG/1
200 CAPSULE ORAL 3 TIMES DAILY PRN
Qty: 30 CAPSULE | Refills: 0 | Status: SHIPPED | OUTPATIENT
Start: 2022-08-15

## 2022-08-15 RX ORDER — METHYLPREDNISOLONE 4 MG
TABLET, DOSE PACK ORAL
Qty: 21 TABLET | Refills: 0 | Status: SHIPPED | OUTPATIENT
Start: 2022-08-15

## 2022-08-15 ASSESSMENT — ENCOUNTER SYMPTOMS
VOMITING: 0
NAUSEA: 0
HEADACHES: 0
DIARRHEA: 0
SORE THROAT: 0
FEVER: 0
COUGH: 1
APPETITE CHANGE: 0
SHORTNESS OF BREATH: 0

## 2022-08-15 NOTE — PROGRESS NOTES
"SUBJECTIVE:   Marisol Banks is a 32 year old female presenting with a chief complaint of   Chief Complaint   Patient presents with     Urgent Care     Cough     Lingering cough after covid, diagnosed 3 weeks ago. Seems to be having some pain near thyroid. Coughing is more of a tickle and keeps her up at night.        She is an established patient of [facility].  Patient presents with a cough x 3 weeks.  Patient diagnosed with COVID 3 weeks.  (2) also has left side neck pain - concerns of thyroid problem.  Cough keeps up at night.  Nonproductive cough.    Treatment:  Teas and cough drops.  Niquil - no help.  Robitussin and mucinex.      Review of Systems   Constitutional: Negative for appetite change and fever.   HENT: Positive for ear pain. Negative for sore throat.    Respiratory: Positive for cough. Negative for shortness of breath.    Cardiovascular: Negative for chest pain.   Gastrointestinal: Negative for diarrhea, nausea and vomiting.   Neurological: Negative for headaches.   All other systems reviewed and are negative.      Past Medical History:   Diagnosis Date     Chest pain     evaluated years ago, thought anxiety, normal Holter, normal EKG     Hypertension     \"white coat\" work ups normal     Indication for care in labor or delivery 2015      (normal spontaneous vaginal delivery) 2015     Postpartum care and examination immediately after delivery 2015     Family History   Problem Relation Age of Onset     Thyroid Disease Mother         Hypothyroid     Heart Disease Father      Myocardial Infarction Maternal Grandmother 50        in her 50s     Heart Disease Maternal Grandmother      Hypertension Maternal Grandfather      Cerebrovascular Disease Maternal Grandfather      Cancer Maternal Grandfather         oral cancer, smoker     Breast Cancer Paternal Grandmother      Cancer Paternal Grandmother         bone marrow     Current Outpatient Medications   Medication Sig Dispense Refill     " "benzonatate (TESSALON) 200 MG capsule Take 1 capsule (200 mg) by mouth 3 times daily as needed for cough 30 capsule 0     methylPREDNISolone (MEDROL DOSEPAK) 4 MG tablet therapy pack Follow Package Directions 21 tablet 0     Social History     Tobacco Use     Smoking status: Never Smoker     Smokeless tobacco: Never Used   Substance Use Topics     Alcohol use: Yes     Comment: Occasionally       OBJECTIVE  /70   Pulse 88   Temp 98.3  F (36.8  C) (Oral)   Resp 15   Ht 1.626 m (5' 4\")   Wt 68 kg (150 lb)   SpO2 98%   Breastfeeding No   BMI 25.75 kg/m      Physical Exam    Labs:  Results for orders placed or performed in visit on 08/15/22 (from the past 24 hour(s))   CBC with platelets and differential    Narrative    The following orders were created for panel order CBC with platelets and differential.  Procedure                               Abnormality         Status                     ---------                               -----------         ------                     CBC with platelets and d...[[id number]]                      Final result                 Please view results for these tests on the individual orders.   CBC with platelets and differential   Result Value Ref Range    WBC Count 6.9 4.0 - 11.0 10e3/uL    RBC Count 4.32 3.80 - 5.20 10e6/uL    Hemoglobin 12.2 11.7 - 15.7 g/dL    Hematocrit 36.0 35.0 - 47.0 %    MCV 83 78 - 100 fL    MCH 28.2 26.5 - 33.0 pg    MCHC 33.9 31.5 - 36.5 g/dL    RDW 12.6 10.0 - 15.0 %    Platelet Count 386 150 - 450 10e3/uL    % Neutrophils 64 %    % Lymphocytes 28 %    % Monocytes 7 %    % Eosinophils 1 %    % Basophils 0 %    % Immature Granulocytes 0 %    Absolute Neutrophils 4.4 1.6 - 8.3 10e3/uL    Absolute Lymphocytes 1.9 0.8 - 5.3 10e3/uL    Absolute Monocytes 0.5 0.0 - 1.3 10e3/uL    Absolute Eosinophils 0.1 0.0 - 0.7 10e3/uL    Absolute Basophils 0.0 0.0 - 0.2 10e3/uL    Absolute Immature Granulocytes 0.0 <=0.4 10e3/uL       X-Ray was done, my findings " are: NAD    ASSESSMENT:      ICD-10-CM    1. Cough  R05.9 XR Chest 2 Views     CBC with platelets and differential     TSH     CBC with platelets and differential     TSH   2. Bronchitis  J40 benzonatate (TESSALON) 200 MG capsule     methylPREDNISolone (MEDROL DOSEPAK) 4 MG tablet therapy pack   3. Bronchospasm  J98.01 methylPREDNISolone (MEDROL DOSEPAK) 4 MG tablet therapy pack        Medical Decision Making:    Differential Diagnosis:  URI Adult/Peds:  Bronchitis-viral and post covid syndrome, pneumonia      Serious Comorbid Conditions:  Adult:  reviewed    PLAN:    Rx for tessalon perles and medrol dose devan.  Push fluids.  Patient education.  TSH pending.  Will call with any abnormalities.    Followup:    If not improving or if condition worsens, follow up with your Primary Care Provider, If not improving or if conditions worsens over the next 12-24 hours, go to the Emergency Department    There are no Patient Instructions on file for this visit.

## 2022-08-31 ENCOUNTER — OFFICE VISIT (OUTPATIENT)
Dept: URGENT CARE | Facility: URGENT CARE | Age: 32
End: 2022-08-31
Payer: COMMERCIAL

## 2022-08-31 VITALS
TEMPERATURE: 100.8 F | SYSTOLIC BLOOD PRESSURE: 139 MMHG | OXYGEN SATURATION: 98 % | HEART RATE: 119 BPM | DIASTOLIC BLOOD PRESSURE: 86 MMHG

## 2022-08-31 DIAGNOSIS — R05.3 POST-COVID CHRONIC COUGH: ICD-10-CM

## 2022-08-31 DIAGNOSIS — R05.9 COUGH: ICD-10-CM

## 2022-08-31 DIAGNOSIS — U09.9 POST-COVID CHRONIC COUGH: ICD-10-CM

## 2022-08-31 DIAGNOSIS — R50.9 ACUTE FEBRILE ILLNESS: ICD-10-CM

## 2022-08-31 DIAGNOSIS — J18.9 PNEUMONIA OF LEFT LOWER LOBE DUE TO INFECTIOUS ORGANISM: Primary | ICD-10-CM

## 2022-08-31 LAB
BASOPHILS # BLD AUTO: 0 10E3/UL (ref 0–0.2)
BASOPHILS NFR BLD AUTO: 0 %
EOSINOPHIL # BLD AUTO: 0 10E3/UL (ref 0–0.7)
EOSINOPHIL NFR BLD AUTO: 0 %
ERYTHROCYTE [DISTWIDTH] IN BLOOD BY AUTOMATED COUNT: 13 % (ref 10–15)
FLUAV AG SPEC QL IA: NEGATIVE
FLUBV AG SPEC QL IA: NEGATIVE
HCT VFR BLD AUTO: 38.3 % (ref 35–47)
HGB BLD-MCNC: 13 G/DL (ref 11.7–15.7)
IMM GRANULOCYTES # BLD: 0 10E3/UL
IMM GRANULOCYTES NFR BLD: 0 %
LYMPHOCYTES # BLD AUTO: 0.8 10E3/UL (ref 0.8–5.3)
LYMPHOCYTES NFR BLD AUTO: 11 %
MCH RBC QN AUTO: 28.3 PG (ref 26.5–33)
MCHC RBC AUTO-ENTMCNC: 33.9 G/DL (ref 31.5–36.5)
MCV RBC AUTO: 83 FL (ref 78–100)
MONOCYTES # BLD AUTO: 0.4 10E3/UL (ref 0–1.3)
MONOCYTES NFR BLD AUTO: 6 %
NEUTROPHILS # BLD AUTO: 6.2 10E3/UL (ref 1.6–8.3)
NEUTROPHILS NFR BLD AUTO: 83 %
PLATELET # BLD AUTO: 277 10E3/UL (ref 150–450)
RBC # BLD AUTO: 4.6 10E6/UL (ref 3.8–5.2)
WBC # BLD AUTO: 7.5 10E3/UL (ref 4–11)

## 2022-08-31 PROCEDURE — 36415 COLL VENOUS BLD VENIPUNCTURE: CPT | Performed by: INTERNAL MEDICINE

## 2022-08-31 PROCEDURE — 99214 OFFICE O/P EST MOD 30 MIN: CPT | Mod: CS | Performed by: INTERNAL MEDICINE

## 2022-08-31 PROCEDURE — 85025 COMPLETE CBC W/AUTO DIFF WBC: CPT | Performed by: INTERNAL MEDICINE

## 2022-08-31 PROCEDURE — 87804 INFLUENZA ASSAY W/OPTIC: CPT | Performed by: INTERNAL MEDICINE

## 2022-08-31 ASSESSMENT — ENCOUNTER SYMPTOMS: SHORTNESS OF BREATH: 0

## 2022-08-31 NOTE — PATIENT INSTRUCTIONS
Chest x-ray -white patchiness left lower lobe of lung  Blood counts are normal  Influenza testing negative      Will start treatment for left lower lobe pneumonia    Take Augmentin 1 tablet twice daily for 10 days.    3-day work note.    I would like you to be seen in 1 week for recheck

## 2022-08-31 NOTE — LETTER
[facility] [place]  [address] [place] [address]  Phone: [phone]    August 31, 2022        Marisol Banks  [address] [facility] [address]          To whom it may concern:    RE: Marisol Banks    Patient was seen and treated today at our clinic.  Please excuse 3 days of work absence for illness.  Potentially she may miss more days depending on her recovery and will need follow-up at clinic.    Please contact me for questions or concerns.      Sincerely,        Patsy Cuello MD

## 2022-08-31 NOTE — PROGRESS NOTES
ASSESSMENT AND PLAN:      ICD-10-CM    1. Pneumonia of left lower lobe due to infectious organism  J18.9 amoxicillin-clavulanate (AUGMENTIN) 875-125 MG tablet   2. Acute febrile illness  R50.9 XR Chest 2 Views     CBC with platelets and differential     Influenza A & B Antigen - Clinic Collect     CBC with platelets and differential   3. Cough  R05.9 XR Chest 2 Views     CBC with platelets and differential     Influenza A & B Antigen - Clinic Collect     CBC with platelets and differential   4. Post-COVID chronic cough  R05.3 XR Chest 2 Views    U09.9 CBC with platelets and differential     Influenza A & B Antigen - Clinic Collect     CBC with platelets and differential       Patient Instructions       Chest x-ray -white patchiness left lower lobe of lung  Blood counts are normal  Influenza testing negative      Will start treatment for left lower lobe pneumonia    Take Augmentin 1 tablet twice daily for 10 days.    3-day work note.    I would like you to be seen in 1 week for recheck    Return in about 1 week (around 9/7/2022).        Patsy Cuello MD  [facility]    Subjective     Marisol Banks is a 32 year old who presents for Patient presents with:  Cough: Pt has had a cough for the last three weeks after covid and its very persistent, was put on steroids to help but every now and then would come back, feels as if the last couple of days the cough has been deep and persistent, feels as if she could throw up, temp came in at 100.8 a little bit ago    an established patient of [facility].    covid Positive 7/27/2022  Patient had virtual visit August 12 recommended in person visit   seen on urgent care August 15, with 3 weeks symptoms from COVID  Chest x-ray and blood counts were normal  treatment medrol pack - helped   Then symptoms returned  Now cough deeper.  Waking at night  Fevers started today 100.8  Body aches & chills    Treatment measures tried include Tylenol/Ibuprofen and OTC  Cough med.  Predisposing factors include ill contact: no-one is sick but exposed to . Works as nurse..      COVID-19  12/22/2020  1 of 2  Comirnaty-PFR 30mcg  Full    No    COVID-19  01/11/2021  2 of 2  Comirnaty-PFR 30mcg  Full    No    COVID-19  12/08/2021  3 of 2  Comirnaty-PFR 30mcg  Full             Review of Systems   Respiratory: Negative for shortness of breath.         Breathing heavier           Objective    /86 (BP Location: Left arm, Patient Position: Sitting, Cuff Size: Adult Regular)   Pulse 119   Temp (!) 100.8  F (38.2  C) (Oral)   SpO2 98%   Physical Exam  Vitals reviewed.   Constitutional:       Appearance: Normal appearance.   HENT:      Right Ear: Tympanic membrane normal.      Left Ear: Tympanic membrane normal.      Mouth/Throat:      Mouth: Mucous membranes are moist.      Pharynx: Oropharynx is clear.   Eyes:      Conjunctiva/sclera: Conjunctivae normal.   Cardiovascular:      Rate and Rhythm: Normal rate and regular rhythm.      Pulses: Normal pulses.      Heart sounds: Normal heart sounds.   Pulmonary:      Effort: Pulmonary effort is normal.      Breath sounds: Normal breath sounds. No wheezing or rhonchi.   Neurological:      Mental Status: She is alert.            CXR - Reviewed and interpreted by me Airspace opacity seen in the left lower lobe  Results for orders placed or performed in visit on 08/31/22 (from the past 24 hour(s))   Influenza A & B Antigen - Clinic Collect    Specimen: Nasopharyngeal; Swab   Result Value Ref Range    Influenza A antigen Negative Negative    Influenza B antigen Negative Negative    Narrative    Test results must be correlated with clinical data. If necessary, results should be confirmed by a molecular assay or viral culture.   CBC with platelets and differential    Narrative    The following orders were created for panel order CBC with platelets and differential.  Procedure                               Abnormality         Status                      ---------                               -----------         ------                     CBC with platelets and d...[[id number]]                      Final result                 Please view results for these tests on the individual orders.   CBC with platelets and differential   Result Value Ref Range    WBC Count 7.5 4.0 - 11.0 10e3/uL    RBC Count 4.60 3.80 - 5.20 10e6/uL    Hemoglobin 13.0 11.7 - 15.7 g/dL    Hematocrit 38.3 35.0 - 47.0 %    MCV 83 78 - 100 fL    MCH 28.3 26.5 - 33.0 pg    MCHC 33.9 31.5 - 36.5 g/dL    RDW 13.0 10.0 - 15.0 %    Platelet Count 277 150 - 450 10e3/uL    % Neutrophils 83 %    % Lymphocytes 11 %    % Monocytes 6 %    % Eosinophils 0 %    % Basophils 0 %    % Immature Granulocytes 0 %    Absolute Neutrophils 6.2 1.6 - 8.3 10e3/uL    Absolute Lymphocytes 0.8 0.8 - 5.3 10e3/uL    Absolute Monocytes 0.4 0.0 - 1.3 10e3/uL    Absolute Eosinophils 0.0 0.0 - 0.7 10e3/uL    Absolute Basophils 0.0 0.0 - 0.2 10e3/uL    Absolute Immature Granulocytes 0.0 <=0.4 10e3/uL

## 2022-09-04 ENCOUNTER — HEALTH MAINTENANCE LETTER (OUTPATIENT)
Age: 32
End: 2022-09-04

## 2022-09-08 ENCOUNTER — VIRTUAL VISIT (OUTPATIENT)
Dept: FAMILY MEDICINE | Facility: CLINIC | Age: 32
End: 2022-09-08
Payer: COMMERCIAL

## 2022-09-08 DIAGNOSIS — J40 BRONCHITIS: ICD-10-CM

## 2022-09-08 DIAGNOSIS — R05.8 POST-VIRAL COUGH SYNDROME: Primary | ICD-10-CM

## 2022-09-08 PROCEDURE — 99213 OFFICE O/P EST LOW 20 MIN: CPT | Mod: GT | Performed by: FAMILY MEDICINE

## 2022-09-08 RX ORDER — LORATADINE 10 MG/1
10 TABLET ORAL DAILY
Qty: 30 TABLET | Refills: 3 | Status: SHIPPED | OUTPATIENT
Start: 2022-09-08

## 2022-09-08 RX ORDER — ALBUTEROL SULFATE 90 UG/1
2 AEROSOL, METERED RESPIRATORY (INHALATION) EVERY 6 HOURS
Qty: 18 G | Refills: 0 | Status: SHIPPED | OUTPATIENT
Start: 2022-09-08

## 2022-09-08 NOTE — PROGRESS NOTES
"Marisol is a 32 year old who is being evaluated via a billable video visit.      How would you like to obtain your AVS? MyChart  If the video visit is dropped, the invitation should be resent by: Text to cell phone: [phone]  Will anyone else be joining your video visit? No          Assessment & Plan     ICD-10-CM    1. Post-viral cough syndrome  R05.8 albuterol (PROAIR HFA/PROVENTIL HFA/VENTOLIN HFA) 108 (90 Base) MCG/ACT inhaler   2. Bronchitis  J40 albuterol (PROAIR HFA/PROVENTIL HFA/VENTOLIN HFA) 108 (90 Base) MCG/ACT inhaler     loratadine (CLARITIN) 10 MG tablet     Medical decision making: Patient presents with persistent cough that has not been present for more than 3 weeks.  Reviewed her last 2 x-rays which were fairly normal per radiology read.  Checked his CBC count during the previous visit and this was normal.  At this time, diagnosis made of postviral cough syndrome and symptomatic care with albuterol as Tessalon Perles does not happen.  Discussed that allergic bronchitis may be in consideration and trial of allergy medication along with albuterol should help relieve the symptoms.  If symptoms persist then further consider CT scan and a referral to pulmonology.           BMI:   Estimated body mass index is 25.75 kg/m  as calculated from the following:    Height as of 8/15/22: 1.626 m (5' 4\").    Weight as of 8/15/22: 68 kg (150 lb).       MEDICATIONS:   Orders Placed This Encounter   Medications     albuterol (PROAIR HFA/PROVENTIL HFA/VENTOLIN HFA) 108 (90 Base) MCG/ACT inhaler     Sig: Inhale 2 puffs into the lungs every 6 hours     Dispense:  18 g     Refill:  0     Pharmacy may dispense brand covered by insurance (Proair, or proventil or ventolin or generic albuterol inhaler)     loratadine (CLARITIN) 10 MG tablet     Sig: Take 1 tablet (10 mg) by mouth daily     Dispense:  30 tablet     Refill:  3          - Continue other medications without change  See Patient Instructions    No follow-ups on " file.    Zaki Wylie MD  [facility] LEONEL Damon is a 32 year old accompanied by her N/A, presenting for the following health issues:  Hospital F/U (Urgent care follow up)      HPI: Seen for an E-visit for cough on 8/15 and had blood counts and thyroid level checked.  Further seen at urgent care on  for persistent cough and had flu test done along with blood counts and x-ray.  Continues to have cough.  No further fevers, no nasal congestion or postnasal drip.  Denies any nausea, vomiting or diarrhea.  Denies any weight loss.  Patient Active Problem List   Diagnosis     Contraception     Fall     Indication for care in labor or delivery     S/P primary low transverse      Current Outpatient Medications   Medication     albuterol (PROAIR HFA/PROVENTIL HFA/VENTOLIN HFA) 108 (90 Base) MCG/ACT inhaler     amoxicillin-clavulanate (AUGMENTIN) 875-125 MG tablet     benzonatate (TESSALON) 200 MG capsule     loratadine (CLARITIN) 10 MG tablet     methylPREDNISolone (MEDROL DOSEPAK) 4 MG tablet therapy pack     No current facility-administered medications for this visit.         Review of Systems   Constitutional, HEENT, cardiovascular, pulmonary, gi and gu systems are negative, except as otherwise noted.      Objective           Vitals:  No vitals were obtained today due to virtual visit.    Physical Exam   GENERAL: Healthy, alert coughing  EYES: Eyes grossly normal to inspection.  No discharge or erythema, or obvious scleral/conjunctival abnormalities.  RESP: Noted hacking cough that is intermittent.  No visible retractions or increased work of breathing.    SKIN: Visible skin clear. No significant rash, abnormal pigmentation or lesions.  NEURO: Cranial nerves grossly intact.  Mentation and speech appropriate for age.  PSYCH: Mentation appears normal, affect normal/bright, judgement and insight intact, normal speech and appearance well-groomed.    Office Visit on  08/31/2022   Component Date Value Ref Range Status     Influenza A antigen 08/31/2022 Negative  Negative Final     Influenza B antigen 08/31/2022 Negative  Negative Final     WBC Count 08/31/2022 7.5  4.0 - 11.0 10e3/uL Final     RBC Count 08/31/2022 4.60  3.80 - 5.20 10e6/uL Final     Hemoglobin 08/31/2022 13.0  11.7 - 15.7 g/dL Final     Hematocrit 08/31/2022 38.3  35.0 - 47.0 % Final     MCV 08/31/2022 83  78 - 100 fL Final     MCH 08/31/2022 28.3  26.5 - 33.0 pg Final     MCHC 08/31/2022 33.9  31.5 - 36.5 g/dL Final     RDW 08/31/2022 13.0  10.0 - 15.0 % Final     Platelet Count 08/31/2022 277  150 - 450 10e3/uL Final     % Neutrophils 08/31/2022 83  % Final     % Lymphocytes 08/31/2022 11  % Final     % Monocytes 08/31/2022 6  % Final     % Eosinophils 08/31/2022 0  % Final     % Basophils 08/31/2022 0  % Final     % Immature Granulocytes 08/31/2022 0  % Final     Absolute Neutrophils 08/31/2022 6.2  1.6 - 8.3 10e3/uL Final     Absolute Lymphocytes 08/31/2022 0.8  0.8 - 5.3 10e3/uL Final     Absolute Monocytes 08/31/2022 0.4  0.0 - 1.3 10e3/uL Final     Absolute Eosinophils 08/31/2022 0.0  0.0 - 0.7 10e3/uL Final     Absolute Basophils 08/31/2022 0.0  0.0 - 0.2 10e3/uL Final     Absolute Immature Granulocytes 08/31/2022 0.0  <=0.4 10e3/uL Final               Video-Visit Details    Video Start Time: 1:42 PM    Type of service:  Video Visit    Video End Time:1:48 PM    Originating Location (pt. Location): Home    Distant Location (provider location):  [facility]     Platform used for Video Visit: [employer]

## 2023-06-03 ENCOUNTER — HEALTH MAINTENANCE LETTER (OUTPATIENT)
Age: 33
End: 2023-06-03

## 2024-06-06 ENCOUNTER — OFFICE VISIT (OUTPATIENT)
Dept: URGENT CARE | Facility: URGENT CARE | Age: 34
End: 2024-06-06
Payer: COMMERCIAL

## 2024-06-06 VITALS
SYSTOLIC BLOOD PRESSURE: 134 MMHG | OXYGEN SATURATION: 98 % | DIASTOLIC BLOOD PRESSURE: 70 MMHG | WEIGHT: 140 LBS | TEMPERATURE: 98.1 F | BODY MASS INDEX: 24.03 KG/M2 | HEART RATE: 82 BPM
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
## 2024-06-06 PROCEDURE — 86618 LYME DISEASE ANTIBODY: CPT | Performed by: PHYSICIAN ASSISTANT

## 2024-06-06 PROCEDURE — 99213 OFFICE O/P EST LOW 20 MIN: CPT | Performed by: PHYSICIAN ASSISTANT

## 2024-06-06 PROCEDURE — 36415 COLL VENOUS BLD VENIPUNCTURE: CPT | Performed by: PHYSICIAN ASSISTANT

## 2024-06-06 RX ORDER — DOXYCYCLINE HYCLATE 100 MG
100 TABLET ORAL 2 TIMES DAILY
Qty: 14 TABLET | Refills: 0 | Status: SHIPPED | OUTPATIENT
Start: 2024-06-06 | End: 2024-06-13

## 2024-06-06 NOTE — PROGRESS NOTES
"SUBJECTIVE:   Marisol Banks is a 34 year old female presenting with a chief complaint of   Chief Complaint   Patient presents with    Urgent Care     Possible bug bite on the right leg, redness and swelling since a week ago. Has flared up in the last couple days.        She is an established patient of [facility].  Patient presents with right lower leg bug bite noted 1 week ago.  She was at a cabin and was outside.  She does not recall taking any ticks off and does not recall any stings.  Noted red and warm. No HA or joint pain.          Review of Systems    Past Medical History:   Diagnosis Date    Chest pain     evaluated years ago, thought anxiety, normal Holter, normal EKG    Hypertension     \"white coat\" work ups normal    Indication for care in labor or delivery 2015     (normal spontaneous vaginal delivery) 2015    Postpartum care and examination immediately after delivery 2015     Family History   Problem Relation Age of Onset    Thyroid Disease Mother         Hypothyroid    Heart Disease Father     Myocardial Infarction Maternal Grandmother 50        in her 50s    Heart Disease Maternal Grandmother     Hypertension Maternal Grandfather     Cerebrovascular Disease Maternal Grandfather     Cancer Maternal Grandfather         oral cancer, smoker    Breast Cancer Paternal Grandmother     Cancer Paternal Grandmother         bone marrow     Current Outpatient Medications   Medication Sig Dispense Refill    doxycycline hyclate (VIBRA-TABS) 100 MG tablet Take 1 tablet (100 mg) by mouth 2 times daily for 7 days 14 tablet 0    albuterol (PROAIR HFA/PROVENTIL HFA/VENTOLIN HFA) 108 (90 Base) MCG/ACT inhaler Inhale 2 puffs into the lungs every 6 hours (Patient not taking: Reported on 2024) 18 g 0    benzonatate (TESSALON) 200 MG capsule Take 1 capsule (200 mg) by mouth 3 times daily as needed for cough (Patient not taking: Reported on 2024) 30 capsule 0    loratadine (CLARITIN) 10 MG tablet " Take 1 tablet (10 mg) by mouth daily (Patient not taking: Reported on 6/6/2024) 30 tablet 3    methylPREDNISolone (MEDROL DOSEPAK) 4 MG tablet therapy pack Follow Package Directions (Patient not taking: Reported on 8/31/2022) 21 tablet 0     Social History     Tobacco Use    Smoking status: Never    Smokeless tobacco: Never   Substance Use Topics    Alcohol use: Yes     Comment: Occasionally       OBJECTIVE  /70 (BP Location: Left arm, Patient Position: Sitting, Cuff Size: Adult Regular)   Pulse 82   Temp 98.1  F (36.7  C) (Tympanic)   Wt 63.5 kg (140 lb)   SpO2 98%   BMI 24.03 kg/m      Physical Exam  Vitals and nursing note reviewed.   Constitutional:       Appearance: Normal appearance. She is normal weight.   Cardiovascular:      Rate and Rhythm: Normal rate.   Skin:     Comments: Right lower leg just below knee medial aspect.  7 cm erythematous area, warm with central edema about 1 cm.  No drainage or fluid collection.     Neurological:      Mental Status: She is alert.         Labs:  No results found for this or any previous visit (from the past 24 hour(s)).    ASSESSMENT:      ICD-10-CM    1. Bug bite, initial encounter  W57.XXXA doxycycline hyclate (VIBRA-TABS) 100 MG tablet     Lyme Disease Total Abs Bld with Reflex to Confirm CLIA           Medical Decision Making:    Differential Diagnosis:  Cellulitis, lyme, bug bite    Serious Comorbid Conditions:  Adult:   reviewed    PLAN:    Rx for doxcycycline.  Lyme dz blood draw.  Discussed reasons to seek immediate medical attention.  Additionally if no improvement or worsening in one week, may follow up with PCP and/or UC.        Followup:    If not improving or if condition worsens, follow up with your Primary Care Provider, If not improving or if conditions worsens over the next 12-24 hours, go to the Emergency Department    There are no Patient Instructions on file for this visit.

## 2024-06-07 LAB — B BURGDOR IGG+IGM SER QL: 0.05

## 2024-07-07 ENCOUNTER — HEALTH MAINTENANCE LETTER (OUTPATIENT)
Age: 34
End: 2024-07-07

## 2024-09-23 ENCOUNTER — OFFICE VISIT (OUTPATIENT)
Dept: URGENT CARE | Facility: URGENT CARE | Age: 34
End: 2024-09-23
Payer: COMMERCIAL

## 2024-09-23 ENCOUNTER — ANCILLARY PROCEDURE (OUTPATIENT)
Dept: GENERAL RADIOLOGY | Facility: CLINIC | Age: 34
End: 2024-09-23
Attending: PHYSICIAN ASSISTANT
Payer: COMMERCIAL

## 2024-09-23 VITALS
RESPIRATION RATE: 16 BRPM | TEMPERATURE: 97.9 F | OXYGEN SATURATION: 97 % | SYSTOLIC BLOOD PRESSURE: 134 MMHG | BODY MASS INDEX: 24.75 KG/M2 | DIASTOLIC BLOOD PRESSURE: 85 MMHG | HEART RATE: 91 BPM | HEIGHT: 64 IN | WEIGHT: 145 LBS

## 2024-09-23 DIAGNOSIS — R05.3 PERSISTENT COUGH: ICD-10-CM

## 2024-09-23 DIAGNOSIS — J18.9 PNEUMONIA OF LEFT UPPER LOBE DUE TO INFECTIOUS ORGANISM: Primary | ICD-10-CM

## 2024-09-23 PROCEDURE — 99214 OFFICE O/P EST MOD 30 MIN: CPT | Performed by: PHYSICIAN ASSISTANT

## 2024-09-23 PROCEDURE — 71046 X-RAY EXAM CHEST 2 VIEWS: CPT | Mod: TC | Performed by: STUDENT IN AN ORGANIZED HEALTH CARE EDUCATION/TRAINING PROGRAM

## 2024-09-23 RX ORDER — ALBUTEROL SULFATE 90 UG/1
2 AEROSOL, METERED RESPIRATORY (INHALATION) EVERY 6 HOURS PRN
Qty: 18 G | Refills: 0 | Status: SHIPPED | OUTPATIENT
Start: 2024-09-23

## 2024-09-23 RX ORDER — AZITHROMYCIN 250 MG/1
TABLET, FILM COATED ORAL
Qty: 6 TABLET | Refills: 0 | Status: SHIPPED | OUTPATIENT
Start: 2024-09-23

## 2024-09-23 RX ORDER — IBUPROFEN 200 MG
200 TABLET ORAL EVERY 4 HOURS PRN
COMMUNITY

## 2024-09-23 RX ORDER — FLUCONAZOLE 150 MG/1
TABLET ORAL
Qty: 2 TABLET | Refills: 0 | Status: SHIPPED | OUTPATIENT
Start: 2024-09-23

## 2024-09-23 NOTE — PATIENT INSTRUCTIONS
(J18.9) Pneumonia of left upper lobe due to infectious organism  (primary encounter diagnosis)  Comment:   Plan: amoxicillin-clavulanate (AUGMENTIN) 875-125 MG         tablet, azithromycin (ZITHROMAX Z-JABIER) 250 MG         tablet, fluconazole (DIFLUCAN) 150 MG tablet  Albuterol inhaler 2 puffs every 4-6 hours prn cough or wheeze            (R05.3) Persistent cough  Comment:   Plan: XR Chest 2 Views            Follow up with primary clinic for re-check in 3 weeks.  Virtual visit is okay.

## 2024-09-23 NOTE — PROGRESS NOTES
"Patient presents with:  Urgent Care  Cough  Fatigue  Fever: Pt in clinic to have eval for fever, cough and fatigue 8 days.    (J18.9) Pneumonia of left upper lobe due to infectious organism  (primary encounter diagnosis)  Comment:   Plan: amoxicillin-clavulanate (AUGMENTIN) 875-125 MG         tablet, azithromycin (ZITHROMAX Z-JABIER) 250 MG         tablet, fluconazole (DIFLUCAN) 150 MG tablet  Albuterol inhaler 2 puffs every 4-6 hours prn cough or wheeze            (R05.3) Persistent cough  Comment:   Plan: XR Chest 2 Views            Follow up with primary clinic for re-check in 3 weeks.  Virtual visit is okay.          At the end of the encounter, I discussed results, diagnosis, medications. Discussed red flags for immediate return to clinic/ER, as well as indications for follow up if no improvement. Patient understood and agreed to plan. Patient was stable for discharge             SUBJECTIVE:   Marisol Banks is a 34 year old female who presents today with a persistent cough and fevers for over 8 days.  NO sneezing or nasal congestion.  Denies any wheezing.  Does feel exhausted after coughing jags and not sleeping well at night.      Her  tested positive for Covid in August.  She has had multiple negative home Covid tests.    Her son has also had a cough for about a month.      SH: Patient works as a nurse.      Patient Active Problem List   Diagnosis    Contraception    Fall    Indication for care in labor or delivery    S/P primary low transverse          Past Medical History:   Diagnosis Date    Chest pain     evaluated years ago, thought anxiety, normal Holter, normal EKG    Hypertension     \"white coat\" work ups normal    Indication for care in labor or delivery 2015     (normal spontaneous vaginal delivery) 2015    Postpartum care and examination immediately after delivery 2015         Current Outpatient Medications   Medication Sig Dispense Refill    Multiple " "Vitamins-Iron (DAILY-ZAIN/IRON/BETA-CAROTENE) TABS TAKE 1 TABLET BY MOUTH DAILY. (Patient not taking: Reported on 10/19/2020) 30 tablet 7     Social History     Tobacco Use    Smoking status: Never Smoker    Smokeless tobacco: Never Used   Substance Use Topics    Alcohol use: Not on file     Family History   Problem Relation Age of Onset    Diabetes Mother     Diabetes Father          ROS:    10 point ROS of systems including Constitutional, Eyes, Respiratory, Cardiovascular, Gastroenterology, Genitourinary, Integumentary, Muscularskeletal, Psychiatric ,neurological were all negative except for pertinent positives noted in my HPI       OBJECTIVE:  /85   Pulse 91   Temp 97.9  F (36.6  C) (Temporal)   Resp 16   Ht 1.626 m (5' 4\")   Wt 65.8 kg (145 lb)   LMP 09/09/2024   SpO2 97%   BMI 24.89 kg/m    Physical Exam:  GENERAL APPEARANCE: healthy, alert and no distress  EYES: EOMI,  PERRL, conjunctiva clear  HENT: ear canals and TM's normal.  Nose with boggy blue turbinates and mouth without ulcers, erythema or lesions  NECK: supple, nontender, no lymphadenopathy  RESP: lungs clear to auscultation   CV: regular rates and rhythm, normal S1 S2, no murmur noted  ABDOMEN:  soft, nontender, no HSM or masses and bowel sounds normal  SKIN: no suspicious lesions or rashes    X-Ray was done, my findings are: Left perihilar infiltrate  "

## 2025-07-13 ENCOUNTER — HEALTH MAINTENANCE LETTER (OUTPATIENT)
Age: 35
End: 2025-07-13

## (undated) DEVICE — DRSG GAUZE 4X4" TOPPER

## (undated) DEVICE — SOL WATER IRRIG 1000ML BOTTLE 07139-09

## (undated) DEVICE — LINEN C-SECTION 5415

## (undated) DEVICE — DRSG TELFA 3X8" 1238

## (undated) DEVICE — GLOVE PROTEXIS BLUE W/NEU-THERA 6.5  2D73EB65

## (undated) DEVICE — CATH TRAY FOLEY 16FR BARDEX W/DRAIN BAG STATLOCK 300316A

## (undated) DEVICE — SU VICRYL 0 CT-1 27" J340H

## (undated) DEVICE — ESU GROUND PAD UNIVERSAL W/O CORD

## (undated) DEVICE — GLOVE PROTEXIS W/NEU-THERA 6.5  2D73TE65

## (undated) DEVICE — DRSG STERI STRIP 1/4X3" R1541

## (undated) DEVICE — BLADE CLIPPER 4406

## (undated) DEVICE — PACK C-SECTION LF PL15OTA83B

## (undated) DEVICE — SOL NACL 0.9% IRRIG 1000ML BOTTLE 07138-09

## (undated) DEVICE — SU MONOCRYL 0 CTX 36" Y398H

## (undated) DEVICE — STPL SKIN PROXIMATE 35 WIDE PMW35

## (undated) DEVICE — SUCTION CANISTER MEDIVAC LINER 3000ML W/LID 65651-530

## (undated) DEVICE — SU VICRYL 3-0 CT-1 36" J338H

## (undated) DEVICE — PREP CHLORAPREP 26ML TINTED ORANGE  260815